# Patient Record
Sex: FEMALE | Race: WHITE | URBAN - METROPOLITAN AREA
[De-identification: names, ages, dates, MRNs, and addresses within clinical notes are randomized per-mention and may not be internally consistent; named-entity substitution may affect disease eponyms.]

---

## 2018-04-05 ENCOUNTER — HISTORICAL (OUTPATIENT)
Dept: SURGERY | Facility: HOSPITAL | Age: 23
End: 2018-04-05

## 2019-11-05 LAB — RAPID GROUP A STREP (OHS): NEGATIVE

## 2019-12-05 LAB
INFLUENZA A ANTIGEN, POC: NEGATIVE
INFLUENZA B ANTIGEN, POC: NEGATIVE
RAPID GROUP A STREP (OHS): NEGATIVE

## 2020-04-28 ENCOUNTER — HISTORICAL (OUTPATIENT)
Dept: URGENT CARE | Facility: CLINIC | Age: 25
End: 2020-04-28

## 2020-04-28 LAB
INFLUENZA A ANTIGEN, POC: NEGATIVE
INFLUENZA B ANTIGEN, POC: NEGATIVE

## 2021-02-19 LAB — RAPID GROUP A STREP (OHS): NEGATIVE

## 2022-04-09 ENCOUNTER — HISTORICAL (OUTPATIENT)
Dept: ADMINISTRATIVE | Facility: HOSPITAL | Age: 27
End: 2022-04-09

## 2022-04-29 VITALS
DIASTOLIC BLOOD PRESSURE: 82 MMHG | BODY MASS INDEX: 24.99 KG/M2 | HEIGHT: 68 IN | OXYGEN SATURATION: 98 % | WEIGHT: 164.88 LBS | SYSTOLIC BLOOD PRESSURE: 122 MMHG

## 2022-04-30 NOTE — OP NOTE
Patient:   Stephania Cervantes            MRN: [id number]            FIN: [id number]               Age:   22 years     Sex:  Female     :  1995   Associated Diagnoses:   None   Author:   Keaton Avila MD            DATE OF SURGERY:   18    SURGEON:  Keaton Avila MD    PREOPERATIVE DIAGNOSIS: Chronic Cholecystitis / Hyperkinetic Gallbladder    POST OPERATIVE DIAGNOSIS:  Same.    PROCEDURE:  Laparoscopic cholecystectomy.    ANESTHESIA:  General endotracheal anesthesia.    ESTIMATED BLOOD LOSS:  Approximately 20 cc.   Blood administered, none.    LAP AND INSTRUMENT COUNT:  Correct X2 at the end of the case.    SPECIMENS:  Gallbladder.    INDICATION AND SIGNIFICANT HISTORY:  Patient is a 22-year-old female complaining of post prandial right upper quadrant pain associated with fatty meals.  Patient was found to have biliary hyperkinesia and chronic cholecystitis with HIDA scan with ejection fraction approximately 85%.  Risks and benefits of surgery were discussed with the patient who voiced the understanding and wish to proceed with elective laparoscopic cholecystectomy.    PROCEDURE IN DETAIL:  Once informed consents were obtained, patient was taken to the operating room and placed supine on the operating table.  After general endotracheal anesthesia was induced, the abdomen was prepped and draped in the standard sterile surgical fashion.  Periumbilical incision was made with the scalpel and the Veress needle was used to enter the abdominal cavity.  Pneumoperitoneum was then created with insufflation.  After adequate insufflation was obtained, 5 millimeter trocar port were placed through this wound.  Two additional 5 millimeter ports were placed in the right upper quadrant followed by 5 millimeter trocar placed subxiphoid.  The gallbladder was then visualized and retracted both superiorly and laterally to achieve the critical view.  Dissection was carried out in lateral to medial fashion.   The cystic duct were first visualized followed by cystic artery appropriate.  Two clips were placed twice proximally on each structure and one distally and resected.  The gallbladder was then removed from the liver bed using the hook cautery and passed off the table as specimen through the periumbilical trocar port site.  Attention was then redirected to the gallbladder fossa, no active bleeding was noted.  Good hemostasis was visualized.  All ports were then removed under direct visualization with no active bleeding noted.  Skin was then closed with 4-0 Vicryl suture in interrupted fashion.  Skin was  cleaned and dry sterile dressing was placed on the wound.  Lap and instrument  counts were correct X2 at the end of the case.  Patient tolerated the procedure well and was transferred to recovery room in good condition.

## 2022-09-16 ENCOUNTER — HISTORICAL (OUTPATIENT)
Dept: ADMINISTRATIVE | Facility: HOSPITAL | Age: 27
End: 2022-09-16

## 2024-10-08 ENCOUNTER — HOSPITAL ENCOUNTER (EMERGENCY)
Facility: HOSPITAL | Age: 29
Discharge: HOME | End: 2024-10-08
Attending: EMERGENCY MEDICINE
Payer: COMMERCIAL

## 2024-10-08 VITALS
HEART RATE: 76 BPM | BODY MASS INDEX: 26.7 KG/M2 | WEIGHT: 174.19 LBS | TEMPERATURE: 98 F | SYSTOLIC BLOOD PRESSURE: 116 MMHG | OXYGEN SATURATION: 100 % | DIASTOLIC BLOOD PRESSURE: 70 MMHG | RESPIRATION RATE: 16 BRPM

## 2024-10-08 DIAGNOSIS — R10.9 ABDOMINAL PAIN, UNSPECIFIED ABDOMINAL LOCATION: Primary | ICD-10-CM

## 2024-10-08 DIAGNOSIS — R11.2 NAUSEA AND VOMITING, UNSPECIFIED VOMITING TYPE: ICD-10-CM

## 2024-10-08 LAB
ALBUMIN SERPL-MCNC: 4.2 G/DL (ref 3.5–5)
ALBUMIN/GLOB SERPL: 1 RATIO (ref 1.1–2)
ALP SERPL-CCNC: 78 UNIT/L (ref 40–150)
ALT SERPL-CCNC: 26 UNIT/L (ref 0–55)
ANION GAP SERPL CALC-SCNC: 10 MEQ/L
AST SERPL-CCNC: 18 UNIT/L (ref 5–34)
BACTERIA #/AREA URNS AUTO: ABNORMAL /HPF
BASOPHILS # BLD AUTO: 0.05 X10(3)/MCL
BASOPHILS NFR BLD AUTO: 0.4 %
BILIRUB SERPL-MCNC: 0.9 MG/DL
BILIRUB UR QL STRIP.AUTO: NEGATIVE
BUN SERPL-MCNC: 9.6 MG/DL (ref 7–18.7)
CALCIUM SERPL-MCNC: 9.8 MG/DL (ref 8.4–10.2)
CAOX CRY UR QL COMP ASSIST: ABNORMAL
CHLORIDE SERPL-SCNC: 108 MMOL/L (ref 98–107)
CLARITY UR: ABNORMAL
CO2 SERPL-SCNC: 21 MMOL/L (ref 22–29)
COLOR UR AUTO: YELLOW
CREAT SERPL-MCNC: 0.83 MG/DL (ref 0.55–1.02)
CREAT/UREA NIT SERPL: 12
EOSINOPHIL # BLD AUTO: 0.22 X10(3)/MCL (ref 0–0.9)
EOSINOPHIL NFR BLD AUTO: 1.8 %
ERYTHROCYTE [DISTWIDTH] IN BLOOD BY AUTOMATED COUNT: 13.1 % (ref 11.5–17)
GFR SERPLBLD CREATININE-BSD FMLA CKD-EPI: >60 ML/MIN/1.73/M2
GLOBULIN SER-MCNC: 4.1 GM/DL (ref 2.4–3.5)
GLUCOSE SERPL-MCNC: 96 MG/DL (ref 74–100)
GLUCOSE UR QL STRIP: NORMAL
HCT VFR BLD AUTO: 45.2 % (ref 37–47)
HGB BLD-MCNC: 15.1 G/DL (ref 12–16)
HGB UR QL STRIP: ABNORMAL
HOLD SPECIMEN: NORMAL
HYALINE CASTS #/AREA URNS LPF: ABNORMAL /LPF
IMM GRANULOCYTES # BLD AUTO: 0.04 X10(3)/MCL (ref 0–0.04)
IMM GRANULOCYTES NFR BLD AUTO: 0.3 %
KETONES UR QL STRIP: NEGATIVE
LEUKOCYTE ESTERASE UR QL STRIP: 500
LIPASE SERPL-CCNC: 18 U/L
LYMPHOCYTES # BLD AUTO: 1.66 X10(3)/MCL (ref 0.6–4.6)
LYMPHOCYTES NFR BLD AUTO: 13.7 %
MAGNESIUM SERPL-MCNC: 2.2 MG/DL (ref 1.6–2.6)
MCH RBC QN AUTO: 29.8 PG (ref 27–31)
MCHC RBC AUTO-ENTMCNC: 33.4 G/DL (ref 33–36)
MCV RBC AUTO: 89.3 FL (ref 80–94)
MONOCYTES # BLD AUTO: 0.68 X10(3)/MCL (ref 0.1–1.3)
MONOCYTES NFR BLD AUTO: 5.6 %
MUCOUS THREADS URNS QL MICRO: ABNORMAL /LPF
NEUTROPHILS # BLD AUTO: 9.48 X10(3)/MCL (ref 2.1–9.2)
NEUTROPHILS NFR BLD AUTO: 78.2 %
NITRITE UR QL STRIP: NEGATIVE
NRBC BLD AUTO-RTO: 0 %
PH UR STRIP: 5 [PH]
PLATELET # BLD AUTO: 465 X10(3)/MCL (ref 130–400)
PMV BLD AUTO: 10.1 FL (ref 7.4–10.4)
POTASSIUM SERPL-SCNC: 4.1 MMOL/L (ref 3.5–5.1)
PROT SERPL-MCNC: 8.3 GM/DL (ref 6.4–8.3)
PROT UR QL STRIP: ABNORMAL
RBC # BLD AUTO: 5.06 X10(6)/MCL (ref 4.2–5.4)
RBC #/AREA URNS AUTO: ABNORMAL /HPF
SODIUM SERPL-SCNC: 139 MMOL/L (ref 136–145)
SP GR UR STRIP.AUTO: 1.02 (ref 1–1.03)
SQUAMOUS #/AREA URNS LPF: ABNORMAL /HPF
UROBILINOGEN UR STRIP-ACNC: NORMAL
WBC # BLD AUTO: 12.13 X10(3)/MCL (ref 4.5–11.5)
WBC #/AREA URNS AUTO: ABNORMAL /HPF

## 2024-10-08 PROCEDURE — 80053 COMPREHEN METABOLIC PANEL: CPT | Performed by: EMERGENCY MEDICINE

## 2024-10-08 PROCEDURE — 99285 EMERGENCY DEPT VISIT HI MDM: CPT | Mod: 25

## 2024-10-08 PROCEDURE — 83690 ASSAY OF LIPASE: CPT | Performed by: EMERGENCY MEDICINE

## 2024-10-08 PROCEDURE — 25000003 PHARM REV CODE 250: Performed by: EMERGENCY MEDICINE

## 2024-10-08 PROCEDURE — 63600175 PHARM REV CODE 636 W HCPCS: Performed by: EMERGENCY MEDICINE

## 2024-10-08 PROCEDURE — 96374 THER/PROPH/DIAG INJ IV PUSH: CPT

## 2024-10-08 PROCEDURE — 96361 HYDRATE IV INFUSION ADD-ON: CPT

## 2024-10-08 PROCEDURE — 83735 ASSAY OF MAGNESIUM: CPT | Performed by: EMERGENCY MEDICINE

## 2024-10-08 PROCEDURE — 81001 URINALYSIS AUTO W/SCOPE: CPT | Performed by: EMERGENCY MEDICINE

## 2024-10-08 PROCEDURE — 87086 URINE CULTURE/COLONY COUNT: CPT | Performed by: EMERGENCY MEDICINE

## 2024-10-08 PROCEDURE — 81015 MICROSCOPIC EXAM OF URINE: CPT | Performed by: EMERGENCY MEDICINE

## 2024-10-08 PROCEDURE — 25500020 PHARM REV CODE 255

## 2024-10-08 PROCEDURE — 85025 COMPLETE CBC W/AUTO DIFF WBC: CPT | Performed by: EMERGENCY MEDICINE

## 2024-10-08 RX ORDER — PROCHLORPERAZINE MALEATE 10 MG
10 TABLET ORAL 3 TIMES DAILY PRN
Qty: 15 TABLET | Refills: 0 | Status: SHIPPED | OUTPATIENT
Start: 2024-10-08

## 2024-10-08 RX ORDER — ONDANSETRON 4 MG/1
4 TABLET, ORALLY DISINTEGRATING ORAL
Status: COMPLETED | OUTPATIENT
Start: 2024-10-08 | End: 2024-10-08

## 2024-10-08 RX ORDER — PROMETHAZINE HYDROCHLORIDE 25 MG/1
25 TABLET ORAL
Status: COMPLETED | OUTPATIENT
Start: 2024-10-08 | End: 2024-10-08

## 2024-10-08 RX ORDER — DICYCLOMINE HYDROCHLORIDE 20 MG/1
20 TABLET ORAL EVERY 6 HOURS
Qty: 120 TABLET | Refills: 0 | Status: SHIPPED | OUTPATIENT
Start: 2024-10-08

## 2024-10-08 RX ORDER — PROCHLORPERAZINE MALEATE 10 MG
10 TABLET ORAL 3 TIMES DAILY PRN
Qty: 15 TABLET | Refills: 0 | Status: SHIPPED | OUTPATIENT
Start: 2024-10-08 | End: 2024-10-08

## 2024-10-08 RX ORDER — DICYCLOMINE HYDROCHLORIDE 20 MG/1
20 TABLET ORAL EVERY 6 HOURS
Qty: 120 TABLET | Refills: 0 | Status: SHIPPED | OUTPATIENT
Start: 2024-10-08 | End: 2024-10-08

## 2024-10-08 RX ORDER — PROCHLORPERAZINE EDISYLATE 5 MG/ML
10 INJECTION INTRAMUSCULAR; INTRAVENOUS
Status: COMPLETED | OUTPATIENT
Start: 2024-10-08 | End: 2024-10-08

## 2024-10-08 RX ORDER — LIDOCAINE HYDROCHLORIDE 20 MG/ML
15 SOLUTION OROPHARYNGEAL
Status: COMPLETED | OUTPATIENT
Start: 2024-10-08 | End: 2024-10-08

## 2024-10-08 RX ADMIN — SODIUM CHLORIDE 1000 ML: 9 INJECTION, SOLUTION INTRAVENOUS at 12:10

## 2024-10-08 RX ADMIN — PROCHLORPERAZINE EDISYLATE 10 MG: 5 INJECTION INTRAMUSCULAR; INTRAVENOUS at 12:10

## 2024-10-08 RX ADMIN — IOHEXOL 100 ML: 350 INJECTION, SOLUTION INTRAVENOUS at 03:10

## 2024-10-08 RX ADMIN — ONDANSETRON 4 MG: 4 TABLET, ORALLY DISINTEGRATING ORAL at 10:10

## 2024-10-08 RX ADMIN — PROMETHAZINE HYDROCHLORIDE 25 MG: 25 TABLET ORAL at 10:10

## 2024-10-08 RX ADMIN — ALUMINUM HYDROXIDE AND MAGNESIUM HYDROXIDE 30 ML: 200; 200 SUSPENSION ORAL at 10:10

## 2024-10-08 RX ADMIN — LIDOCAINE HYDROCHLORIDE 15 ML: 20 SOLUTION ORAL at 10:10

## 2024-10-08 NOTE — ED PROVIDER NOTES
"ED PROVIDER NOTE  10/8/2024    CHIEF COMPLAINT:   Chief Complaint   Patient presents with    Abdominal Pain     PT W CO ABD PAIN W NVD X 2 WEEKS. WORSE SINCE YESTERDAY.        HISTORY OF PRESENT ILLNESS:   Stephania Cervantes is a 29 y.o. female who presents with chief complaint Abdominal pain. Onset was today with epigastric abdominal pain associated with nonbloody diarrhea that began yesterday and nausea and vomiting today. She states that she "stays with nausea and usually takes promethazine."  She reports that for the past 2 weeks had LLQ abdominal pain that has since resolved.    The history is provided by the patient.         REVIEW OF SYSTEMS: as noted in the HPI.  NURSING NOTES REVIEWED      PAST MEDICAL/SURGICAL HISTORY: History reviewed. No pertinent past medical history. History reviewed. No pertinent surgical history.    FAMILY HISTORY: No family history on file.    SOCIAL HISTORY:   Social History     Tobacco Use    Smoking status: Never    Smokeless tobacco: Never       ALLERGIES:   Review of patient's allergies indicates:   Allergen Reactions    Penicillins Other (See Comments) and Hives       PHYSICAL EXAM:  Initial Vitals [10/08/24 1020]   BP Pulse Resp Temp SpO2   107/72 84 16 97.9 °F (36.6 °C) 100 %      MAP       --         Physical Exam    Nursing note and vitals reviewed.  Constitutional: She appears well-developed and well-nourished.   HENT:   Head: Normocephalic and atraumatic. Mouth/Throat: Uvula is midline and mucous membranes are normal.   Eyes: EOM are normal. Pupils are equal, round, and reactive to light.   Neck: Trachea normal. Neck supple.   Cardiovascular:  Normal rate, regular rhythm, intact distal pulses and normal pulses.           Pulmonary/Chest: Effort normal and breath sounds normal.   Abdominal: Abdomen is soft. Bowel sounds are normal. There is abdominal tenderness in the right upper quadrant and epigastric area. There is no rebound, no guarding, no tenderness at " McBurney's point and negative Bliss's sign.   Musculoskeletal:         General: Normal range of motion.      Cervical back: Neck supple.     Neurological: She is alert and oriented to person, place, and time. GCS eye subscore is 4. GCS verbal subscore is 5. GCS motor subscore is 6.   Skin: Skin is warm and dry.   Psychiatric: She has a normal mood and affect. Her speech is normal. Thought content normal.         RESULTS:  Labs Reviewed   URINALYSIS, REFLEX TO URINE CULTURE - Abnormal       Result Value    Color, UA Yellow      Appearance, UA Turbid (*)     Specific Gravity, UA 1.024      pH, UA 5.0      Protein, UA Trace (*)     Glucose, UA Normal      Ketones, UA Negative      Blood, UA 3+ (*)     Bilirubin, UA Negative      Urobilinogen, UA Normal      Nitrites, UA Negative      Leukocyte Esterase,  (*)     RBC, UA 6-10 (*)     WBC, UA 11-20 (*)     Bacteria, UA Trace (*)     Squamous Epithelial Cells, UA Few (*)     Mucous, UA Few (*)     Hyaline Casts, UA None Seen      Calcium Oxalate Crystals, UA Many (*)    COMPREHENSIVE METABOLIC PANEL - Abnormal    Sodium 139      Potassium 4.1      Chloride 108 (*)     CO2 21 (*)     Glucose 96      Blood Urea Nitrogen 9.6      Creatinine 0.83      Calcium 9.8      Protein Total 8.3      Albumin 4.2      Globulin 4.1 (*)     Albumin/Globulin Ratio 1.0 (*)     Bilirubin Total 0.9      ALP 78      ALT 26      AST 18      eGFR >60      Anion Gap 10.0      BUN/Creatinine Ratio 12     CBC WITH DIFFERENTIAL - Abnormal    WBC 12.13 (*)     RBC 5.06      Hgb 15.1      Hct 45.2      MCV 89.3      MCH 29.8      MCHC 33.4      RDW 13.1      Platelet 465 (*)     MPV 10.1      Neut % 78.2      Lymph % 13.7      Mono % 5.6      Eos % 1.8      Basophil % 0.4      Lymph # 1.66      Neut # 9.48 (*)     Mono # 0.68      Eos # 0.22      Baso # 0.05      IG# 0.04      IG% 0.3      NRBC% 0.0     MAGNESIUM - Normal    Magnesium Level 2.20     LIPASE - Normal    Lipase Level 18      CULTURE, URINE    Urine Culture No Growth At 24 Hours     CBC W/ AUTO DIFFERENTIAL    Narrative:     The following orders were created for panel order CBC auto differential.  Procedure                               Abnormality         Status                     ---------                               -----------         ------                     CBC with Differential[[id number]]       Abnormal            Final result                 Please view results for these tests on the individual orders.   EXTRA TUBES    Narrative:     The following orders were created for panel order EXTRA TUBES.  Procedure                               Abnormality         Status                     ---------                               -----------         ------                     Light Blue Top Hold[[id number]]                             Final result               Light Green Top Hold[[id number]]                            Final result               Gold Top Hold[[id number]]                                   Final result               Pink Top Hold[[id number]]                                   Final result                 Please view results for these tests on the individual orders.   LIGHT BLUE TOP HOLD    Extra Tube Hold for add-ons.     LIGHT GREEN TOP HOLD    Extra Tube Hold for add-ons.     GOLD TOP HOLD    Extra Tube Hold for add-ons.     PINK TOP HOLD    Extra Tube Hold for add-ons.       Imaging Results              CT Abdomen Pelvis With IV Contrast NO Oral Contrast (Final result)  Result time 10/08/24 15:27:03      Final result by Henrique Harkins MD (10/08/24 15:27:03)                   Impression:      Left ovarian cysts    Otherwise unremarkable      Electronically signed by: Yandel Harkins  Date:    10/08/2024  Time:    15:27               Narrative:    EXAMINATION:  CT ABDOMEN PELVIS WITH IV CONTRAST    CLINICAL HISTORY:  Epigastric pain;Nausea/vomiting;    TECHNIQUE:  Low dose axial images, sagittal and coronal  reformations were obtained from the lung bases to the pubic symphysis following the IV administration of contrast. Automatic exposure control (AEC) is utilized to reduce patient radiation exposure.    COMPARISON:  06/09/2016    FINDINGS:  The lung bases are clear.    The liver appears normal.  There is a punctate cystic area along the inferior margin of the liver..  Portal and hepatic veins appear normal.    The patient is status post cholecystectomy..    The pancreas appears normal.  No pancreatic mass or lesion is seen.    The spleen shows no acute abnormality.    The adrenal glands appear normal.  No adrenal nodule is seen.    The kidneys appear normal.  No hydronephrosis is seen.  No hydroureter is seen.  No nephrolithiasis is seen.  No obvious ureteral stones are seen.    Urinary bladder appears grossly unremarkable.    The uterus appears normal for the patient's age.  There are some ovarian cysts seen the left side.  One cyst measures 4.1 x 2.7 cm and the other 1 measures 3.1 x 2.8 cm.    No colitis is seen.  No diverticulitis is seen.  No obvious colonic mass or lesion is seen.  The appendix appears normal.    No free air is seen.  No free fluid is seen.                        Wet Read by Papa De La Paz DO (10/08/24 15:20:46, [facility], Emergency Medicine)    Scattered Fluid-filled loops of small bowel, no bowel obstruction                                    PROCEDURES:  Procedures    ECG:       ED COURSE AND MEDICAL DECISION MAKING:  Medications   aluminum-magnesium hydroxide 200-200 mg/5 mL suspension 30 mL (30 mLs Oral Given 10/8/24 1040)   LIDOcaine viscous HCl 2% oral solution 15 mL (15 mLs Oral Given 10/8/24 1040)   ondansetron disintegrating tablet 4 mg (4 mg Oral Given 10/8/24 1040)   promethazine tablet 25 mg (25 mg Oral Given 10/8/24 1040)   sodium chloride 0.9% bolus 1,000 mL 1,000 mL (0 mLs Intravenous Stopped 10/8/24 1349)   prochlorperazine injection Soln 10 mg (10 mg  Intravenous Given 10/8/24 1250)   iohexoL (OMNIPAQUE 350) 350 mg iodine/mL injection (100 mLs  Given 10/8/24 1510)     ED Course as of 10/09/24 0750   Tue Oct 08, 2024   1117 WBC(!): 12.13 [IB]   1117 Hemoglobin: 15.1 [IB]   1117 Platelet Count(!): 465 [IB]   1118 Creatinine: 0.83 [IB]   1118 NITRITE UA: Negative [IB]   1118 Leukocyte Esterase, UA(!): 500 [IB]   1118 RBC, UA(!): 6-10 [IB]   1118 WBC, UA(!): 11-20 [IB]   1118 Bacteria, UA(!): Trace [IB]   1118 Lipase: 18 [IB]   1118 Magnesium : 2.20 [IB]      ED Course User Index  [IB] Papa De La Paz, DO        Medical Decision Making  This patient presents with abdominal pain of unclear etiology. A CT scan was performed to evaluate for potential causes of the abdominal pain, however, neither the clinical exam nor the CT has identified an emergent etiology for the abdominal pain. Specifically, given the benign exam, the laboratory studies, and unremarkable CT, I have a very low suspicion for appendicitis, ischemic bowel, bowel perforation, or any other life threatening disease. I have discussed with the patient the level of uncertainty with undifferentiated abdominal pain and clearly explained the need to follow-up as noted on the discharge instructions, or return to the Emergency Department immediately if the pain worsens, develops fever, persistent and uncontrollable vomiting, or for any new symptoms or concerns.  Given strict ED return precautions. I have spoken with the patient and/or caregivers. I have explained the patient's condition, diagnoses and treatment plan based on the information available to me at this time. I have answered the patient's and/or caregiver's questions and addressed any concerns. The patient and/or caregivers have as good an understanding of the patient's diagnosis, condition and treatment plan as can be expected at this point. The vital signs have been stable. The patient's condition is stable and appropriate for discharge from the  emergency department.     The patient will pursue further outpatient evaluation with the primary care physician or other designated or consulting physician as outlined in the discharge instructions. The patient and/or caregivers are agreeable to this plan of care and follow-up instructions have been explained in detail. The patient and/or caregivers have received these instructions in written format and have expressed an understanding of the discharge instructions. The patient and/or caregivers are aware that any significant change in condition or worsening of symptoms should prompt an immediate return to this or the closest emergency department or a call to 911.    Amount and/or Complexity of Data Reviewed  Labs: ordered. Decision-making details documented in ED Course.  Radiology: ordered and independent interpretation performed.    Risk  OTC drugs.  Prescription drug management.        CLINICAL IMPRESSION:  1. Abdominal pain, unspecified abdominal location    2. Nausea and vomiting, unspecified vomiting type        DISPOSITION:   ED Disposition Condition    Discharge Stable            ED Prescriptions       Medication Sig Dispense Start Date End Date Auth. Provider    prochlorperazine (COMPAZINE) 10 MG tablet  (Status: Discontinued) Take 1 tablet (10 mg total) by mouth 3 (three) times daily as needed (nausea vomiting). 15 tablet 10/8/2024 10/8/2024 Papa De La Paz DO    dicyclomine (BENTYL) 20 mg tablet  (Status: Discontinued) Take 1 tablet (20 mg total) by mouth every 6 (six) hours. 120 tablet 10/8/2024 10/8/2024 Papa De La Paz DO    dicyclomine (BENTYL) 20 mg tablet Take 1 tablet (20 mg total) by mouth every 6 (six) hours. 120 tablet 10/8/2024 -- Papa De La Paz DO    prochlorperazine (COMPAZINE) 10 MG tablet Take 1 tablet (10 mg total) by mouth 3 (three) times daily as needed (nausea vomiting). 15 tablet 10/8/2024 -- Papa De La Paz DO          Follow-up Information       Follow up With Specialties Details  Why Contact Info    [facility] Emergency Medicine  If symptoms worsen [address] [facility] [place] [address] [phone]               Papa De La Paz DO  10/09/24 0757

## 2024-10-10 LAB — BACTERIA UR CULT: NORMAL

## 2024-11-11 DIAGNOSIS — K58.1 IRRITABLE BOWEL SYNDROME WITH CONSTIPATION: Primary | ICD-10-CM

## 2024-11-11 DIAGNOSIS — K43.9 VENTRAL HERNIA WITHOUT OBSTRUCTION OR GANGRENE: ICD-10-CM

## 2024-11-12 ENCOUNTER — HOSPITAL ENCOUNTER (OUTPATIENT)
Dept: RADIOLOGY | Facility: HOSPITAL | Age: 29
Discharge: HOME | End: 2024-11-12
Attending: STUDENT IN AN ORGANIZED HEALTH CARE EDUCATION/TRAINING PROGRAM
Payer: COMMERCIAL

## 2024-11-12 DIAGNOSIS — K43.9 VENTRAL HERNIA WITHOUT OBSTRUCTION OR GANGRENE: ICD-10-CM

## 2024-11-12 DIAGNOSIS — K58.1 IRRITABLE BOWEL SYNDROME WITH CONSTIPATION: ICD-10-CM

## 2024-11-12 PROCEDURE — 76700 US EXAM ABDOM COMPLETE: CPT | Mod: TC

## 2024-12-31 ENCOUNTER — OFFICE VISIT (OUTPATIENT)
Dept: PRIMARY CARE CLINIC | Facility: CLINIC | Age: 29
End: 2024-12-31
Payer: COMMERCIAL

## 2024-12-31 VITALS
WEIGHT: 179 LBS | HEART RATE: 78 BPM | SYSTOLIC BLOOD PRESSURE: 113 MMHG | DIASTOLIC BLOOD PRESSURE: 71 MMHG | TEMPERATURE: 98 F | OXYGEN SATURATION: 99 % | BODY MASS INDEX: 27.13 KG/M2 | RESPIRATION RATE: 18 BRPM | HEIGHT: 68 IN

## 2024-12-31 DIAGNOSIS — Z00.00 ENCOUNTER FOR MEDICAL EXAMINATION TO ESTABLISH CARE: Primary | ICD-10-CM

## 2024-12-31 DIAGNOSIS — K22.2 ESOPHAGEAL STRICTURE: ICD-10-CM

## 2024-12-31 DIAGNOSIS — K42.9 UMBILICAL HERNIA WITHOUT OBSTRUCTION AND WITHOUT GANGRENE: ICD-10-CM

## 2024-12-31 DIAGNOSIS — F41.1 GENERALIZED ANXIETY DISORDER: ICD-10-CM

## 2024-12-31 DIAGNOSIS — K76.89 LIVER CYST: ICD-10-CM

## 2024-12-31 DIAGNOSIS — D49.7 PITUITARY TUMOR: ICD-10-CM

## 2024-12-31 PROCEDURE — 1160F RVW MEDS BY RX/DR IN RCRD: CPT | Mod: CPTII,,, | Performed by: FAMILY MEDICINE

## 2024-12-31 PROCEDURE — 99204 OFFICE O/P NEW MOD 45 MIN: CPT | Mod: ,,, | Performed by: FAMILY MEDICINE

## 2024-12-31 PROCEDURE — 3074F SYST BP LT 130 MM HG: CPT | Mod: CPTII,,, | Performed by: FAMILY MEDICINE

## 2024-12-31 PROCEDURE — 3078F DIAST BP <80 MM HG: CPT | Mod: CPTII,,, | Performed by: FAMILY MEDICINE

## 2024-12-31 PROCEDURE — G2211 COMPLEX E/M VISIT ADD ON: HCPCS | Mod: ,,, | Performed by: FAMILY MEDICINE

## 2024-12-31 PROCEDURE — 1159F MED LIST DOCD IN RCRD: CPT | Mod: CPTII,,, | Performed by: FAMILY MEDICINE

## 2024-12-31 PROCEDURE — 3008F BODY MASS INDEX DOCD: CPT | Mod: CPTII,,, | Performed by: FAMILY MEDICINE

## 2024-12-31 RX ORDER — AMITRIPTYLINE HYDROCHLORIDE 10 MG/1
10 TABLET, FILM COATED ORAL NIGHTLY PRN
Qty: 90 TABLET | Refills: 3 | Status: SHIPPED | OUTPATIENT
Start: 2024-12-31 | End: 2025-12-31

## 2024-12-31 RX ORDER — HYDROXYZINE PAMOATE 25 MG/1
25 CAPSULE ORAL EVERY 8 HOURS PRN
Qty: 15 CAPSULE | Refills: 0 | Status: SHIPPED | OUTPATIENT
Start: 2024-12-31 | End: 2025-01-05

## 2024-12-31 NOTE — ASSESSMENT & PLAN NOTE
Trial of vistaril at bedtime or for attacks, can cause sedation.  Amitriptyline at bedtime to see if it will help with sleep, mood, nausea, and HA.

## 2024-12-31 NOTE — PROGRESS NOTES
Family Medicine    Patient ID: [id number]     Chief Complaint: Establish Care (Left side pain over a month. Feeling depress lately. Hernia. Having trouble focusing. )      HPI:     Stephania Cervantes is a 29 y.o. female here today to establish care. Multiple concerns today.    L sided pain: went to ER and got CT and labs, US after that showed a small ventral hernia, CT with L ovarian cysts otherwise no concerns.  Worse right after eating and with abdominal pressure  Pituitary tumor: MRI with concern of enlargement  3. Hx IBS-D, now with constipation, early satiety, hx of esophageal stricture with nausea now and dysphasia, hx GERD but not taking PPIs.  4. Ovarian cysts: seeing OBGYN, no meds at this time, hx of OCP use  4. Incidental liver cyst: size not reported on imaging 2024, not seen on US 11/2024  5. Vascular abnormality on MRI 2024, no other details given on MRI, hx of HA, on and off blurry vision, arm and leg weakness  6. Chiari malformation: saw Neuro and did not like plan of care, no meds taken,   7. Inattention: getting evaluated for ADD/ADHD from psych due to meds like Zoloft and Wellbutrin not helping with anxiety or depression, no SI ro HI  8. Anxiety: associated insomnia, mind racing, feeling tired all the time, no focal cause.      Past Medical History:   Diagnosis Date    Chiari malformation type I     Esophageal stricture     Fibrocystic disease of both breasts     Generalized anxiety disorder     GERD (gastroesophageal reflux disease)     Liver cyst     Pituitary tumor     Umbilical hernia         Past Surgical History:   Procedure Laterality Date    CHOLECYSTECTOMY  2017    FRACTURE SURGERY  2015    Left ankle        Social History     Tobacco Use    Smoking status: Every Day     Types: Vaping with nicotine    Smokeless tobacco: Never   Substance and Sexual Activity    Alcohol use: Yes     Alcohol/week: 1.0 standard drink of alcohol     Types: 1 Cans of beer per week    Drug use: Never     "Sexual activity: Yes     Partners: Female     Birth control/protection: None        Current Outpatient Medications   Medication Instructions    amitriptyline (ELAVIL) 10 mg, Oral, Nightly PRN    hydrOXYzine pamoate (VISTARIL) 25 mg, Oral, Every 8 hours PRN         Review of patient's allergies indicates:   Allergen Reactions    Penicillins Other (See Comments) and Hives        Patient Care Team:  Jeanine Jasso MD as PCP - General (Family Medicine)     Subjective:     Review of Systems    12 point review of systems conducted, negative except as stated in the history of present illness. See HPI for details.    Objective:     Visit Vitals  /71 (BP Location: Right arm, Patient Position: Sitting)   Pulse 78   Temp 97.8 °F (36.6 °C) (Oral)   Resp 18   Ht 5' 7.72" (1.72 m)   Wt 81.2 kg (179 lb)   SpO2 99%   BMI 27.44 kg/m²       Physical Exam  Vitals and nursing note reviewed.   Constitutional:       Appearance: Normal appearance.   HENT:      Mouth/Throat:      Mouth: Mucous membranes are moist.   Cardiovascular:      Rate and Rhythm: Normal rate and regular rhythm.   Pulmonary:      Effort: Pulmonary effort is normal.      Breath sounds: Normal breath sounds.   Neurological:      General: No focal deficit present.      Mental Status: She is alert.   Psychiatric:         Mood and Affect: Mood normal.         Labs Reviewed:     Chemistry:  Lab Results   Component Value Date     10/08/2024    K 4.1 10/08/2024    BUN 9.6 10/08/2024    CREATININE 0.83 10/08/2024    EGFRNORACEVR >60 10/08/2024    GLUCOSE 96 10/08/2024    CALCIUM 9.8 10/08/2024    ALKPHOS 78 10/08/2024    LABPROT 8.3 10/08/2024    ALBUMIN 4.2 10/08/2024    BILIDIR 0.4 04/20/2021    IBILI 0.70 04/20/2021    AST 18 10/08/2024    ALT 26 10/08/2024    MG 2.20 10/08/2024    TSH 1.810 08/03/2022        Lab Results   Component Value Date    HGBA1C 5.6 12/12/2022        Hematology:  Lab Results   Component Value Date    WBC 12.13 (H) 10/08/2024    HGB " 15.1 10/08/2024    HCT 45.2 10/08/2024     (H) 10/08/2024       Lipid Panel:  Lab Results   Component Value Date    CHOL 154 08/03/2022    HDL 43 08/03/2022    TRIG 105 08/03/2022        Urine:  Lab Results   Component Value Date    APPEARANCEUA Turbid (A) 10/08/2024    SGUA 1.024 10/08/2024    PROTEINUA Trace (A) 10/08/2024    KETONESUA Negative 10/08/2024    LEUKOCYTESUR 500 (A) 10/08/2024    RBCUA 6-10 (A) 10/08/2024    WBCUA 11-20 (A) 10/08/2024    BACTERIA Trace (A) 10/08/2024    SQEPUA Few (A) 10/08/2024    HYALINECASTS None Seen 10/08/2024        Assessment:       ICD-10-CM ICD-9-CM   1. Encounter for medical examination to establish care  Z00.00 V70.9   2. Esophageal stricture  K22.2 530.3   3. Generalized anxiety disorder  F41.1 300.02   4. Pituitary tumor  D49.7 239.7   5. Umbilical hernia without obstruction and without gangrene  K42.9 553.1   6. Liver cyst  K76.89 573.8        Plan:     1. Encounter for medical examination to establish care  -     CBC Auto Differential; Future; Expected date: 12/31/2024  -     Comprehensive Metabolic Panel; Future; Expected date: 12/31/2024  -     Lipid Panel; Future; Expected date: 12/31/2024  -     TSH; Future; Expected date: 12/31/2024  -     Hemoglobin A1C; Future; Expected date: 12/31/2024    2. Esophageal stricture  Assessment & Plan:  May be cause of some of the nausea.  GI referral placed today.  Continue to eat small meals as tolerated.  May need to get back on to PPI even without other symptoms.      Orders:  -     Ambulatory referral/consult to Gastroenterology; Future; Expected date: 01/07/2025    3. Generalized anxiety disorder  Assessment & Plan:  Trial of vistaril at bedtime or for attacks, can cause sedation.  Amitriptyline at bedtime to see if it will help with sleep, mood, nausea, and HA.     Orders:  -     hydrOXYzine pamoate (VISTARIL) 25 MG Cap; Take 1 capsule (25 mg total) by mouth every 8 (eight) hours as needed (as needed for itching).   Dispense: 15 capsule; Refill: 0  -     amitriptyline (ELAVIL) 10 MG tablet; Take 1 tablet (10 mg total) by mouth nightly as needed for Insomnia.  Dispense: 90 tablet; Refill: 3    4. Pituitary tumor  Assessment & Plan:  Levels stable 2024, larger per MRI 2024 compared to 3 years before. Will need to follow.         5. Umbilical hernia without obstruction and without gangrene  Assessment & Plan:  Referral placed to GI for assistance.  US did not state if fatty tissue or gut protrusion.  ER for severe worsening.     Orders:  -     Ambulatory referral/consult to General Surgery; Future; Expected date: 01/07/2025    6. Liver cyst  Assessment & Plan:  Continue to monitor with repeat US planned 5/2025.             Follow up in about 4 weeks (around 1/28/2025) for Annual Wellness, With labwork prior to visit. In addition to their scheduled follow up, the patient has also been instructed to follow up on as needed basis.     Future Appointments   Date Time Provider Department Center   1/31/2025  9:30 AM Jeanine Jasso MD [facility] JACKIE Jasso MD

## 2024-12-31 NOTE — ASSESSMENT & PLAN NOTE
May be cause of some of the nausea.  GI referral placed today.  Continue to eat small meals as tolerated.  May need to get back on to PPI even without other symptoms.

## 2024-12-31 NOTE — ASSESSMENT & PLAN NOTE
Referral placed to GI for assistance.  US did not state if fatty tissue or gut protrusion.  ER for severe worsening.

## 2025-02-25 ENCOUNTER — TELEPHONE (OUTPATIENT)
Dept: PRIMARY CARE CLINIC | Facility: CLINIC | Age: 30
End: 2025-02-25
Payer: COMMERCIAL

## 2025-02-25 NOTE — TELEPHONE ENCOUNTER
----- Message from Jeanine Jasso MD sent at 2/25/2025  3:48 PM CST -----  Patient missed her appt.  Please have her follow up in clinic for additional management.  ----- Message -----  From: Ladan Abraham LPN  Sent: 2/25/2025   3:28 PM CST  To: Jeanine Jasso MD      ----- Message -----  From: Jenifer De La Cruz  Sent: 2/25/2025   3:27 PM CST  To: Romero Solorzano Staff    .Who Called: Stephania CervantesPreferneo Method of Contact: Phone CallPatient's Preferred Phone Number on File: [phone] Best Call Back Number, if different:Additional Information: pt asking for referral  to Dr.Hrishikesh Saenz ph [phone] pt ask if not be sent to [facility] and she don't mind traveling to [place] or [place]

## 2025-02-28 LAB
CHLAMYDIA TRACHOMATIS (PRECISION): NEGATIVE
NEISSERIA GONORRHOEAE (PRECISION): NEGATIVE
PAP RECOMMENDATION EXT: NORMAL
PAP SMEAR: NORMAL
TRICHOMONAS VAGINALIS (PRECISION): NEGATIVE

## 2025-03-10 ENCOUNTER — TELEPHONE (OUTPATIENT)
Dept: PRIMARY CARE CLINIC | Facility: CLINIC | Age: 30
End: 2025-03-10
Payer: COMMERCIAL

## 2025-03-10 NOTE — TELEPHONE ENCOUNTER
----- Message from Jeanine Jasso MD sent at 3/10/2025 11:43 AM CDT -----   screening is not recommended unless you have a strong family history of ovarian cancer.  It can be falsely positive.  Please review risks vs benefits with GYN.  ----- Message -----  From: Ladan Abraham LPN  Sent: 3/10/2025  11:23 AM CDT  To: Jeanine Jasso MD      ----- Message -----  From: Jenifer De La Cruz  Sent: 3/10/2025  11:22 AM CDT  To: Romero Solorzano Staff    .Who Called: Stephania Delgado BillyPreferneo Method of Contact: Phone CallPatient's Preferred Phone Number on File: [phone] Best Call Back Number, if different:Additional Information: pt asking to add to her labs ca-125 check levels for cancer please advice

## 2025-03-10 NOTE — TELEPHONE ENCOUNTER
----- Message from Kerry sent at 3/10/2025 11:43 AM CDT -----  Who Called: Stephania Tran is requesting assistance/information from provider's office.Symptoms (please be specific):  How long has patient had these symptoms:  List of preferred pharmacies on file (remove unneeded): [unfilled]If different, enter pharmacy into here including location and phone number: Preferred Method of Contact: Phone CallPatient's Preferred Phone Number on File: [phone] Best Call Back Number, if different:Additional Information: Pt called requesting to speak with nurse about some urgent lab work that her Obgyn is requesting

## 2025-03-10 NOTE — TELEPHONE ENCOUNTER
----- Message from Jenifer sent at 3/10/2025 12:36 PM CDT -----  .Who Called: Stephania CervantesPreferneo Method of Contact: Phone CallPatient's Preferred Phone Number on File: [phone] Best Call Back Number, if different:Additional Information: pt gyn called her and said she needs labs and her surgery will be moved up early as well add type & screen  please send as stat  and also call pt

## 2025-03-11 ENCOUNTER — LAB VISIT (OUTPATIENT)
Dept: LAB | Facility: HOSPITAL | Age: 30
End: 2025-03-11
Attending: FAMILY MEDICINE
Payer: COMMERCIAL

## 2025-03-11 ENCOUNTER — OFFICE VISIT (OUTPATIENT)
Dept: PRIMARY CARE CLINIC | Facility: CLINIC | Age: 30
End: 2025-03-11
Payer: COMMERCIAL

## 2025-03-11 ENCOUNTER — RESULTS FOLLOW-UP (OUTPATIENT)
Dept: PRIMARY CARE CLINIC | Facility: CLINIC | Age: 30
End: 2025-03-11

## 2025-03-11 VITALS
WEIGHT: 170 LBS | HEART RATE: 90 BPM | BODY MASS INDEX: 26.68 KG/M2 | HEIGHT: 67 IN | SYSTOLIC BLOOD PRESSURE: 126 MMHG | DIASTOLIC BLOOD PRESSURE: 75 MMHG

## 2025-03-11 DIAGNOSIS — Z00.00 ENCOUNTER FOR MEDICAL EXAMINATION TO ESTABLISH CARE: ICD-10-CM

## 2025-03-11 DIAGNOSIS — D49.7 PITUITARY TUMOR: ICD-10-CM

## 2025-03-11 DIAGNOSIS — F41.1 GENERALIZED ANXIETY DISORDER: ICD-10-CM

## 2025-03-11 DIAGNOSIS — Z00.00 WELLNESS EXAMINATION: Primary | ICD-10-CM

## 2025-03-11 DIAGNOSIS — K76.89 LIVER CYST: ICD-10-CM

## 2025-03-11 DIAGNOSIS — G47.00 INSOMNIA, UNSPECIFIED TYPE: ICD-10-CM

## 2025-03-11 DIAGNOSIS — R11.0 NAUSEA: ICD-10-CM

## 2025-03-11 LAB
ALBUMIN SERPL-MCNC: 4 G/DL (ref 3.5–5)
ALBUMIN/GLOB SERPL: 1.1 RATIO (ref 1.1–2)
ALP SERPL-CCNC: 64 UNIT/L (ref 40–150)
ALT SERPL-CCNC: 18 UNIT/L (ref 0–55)
ANION GAP SERPL CALC-SCNC: 9 MEQ/L
AST SERPL-CCNC: 15 UNIT/L (ref 5–34)
BASOPHILS # BLD AUTO: 0.04 X10(3)/MCL
BASOPHILS NFR BLD AUTO: 0.6 %
BILIRUB SERPL-MCNC: 1.1 MG/DL
BUN SERPL-MCNC: 9.8 MG/DL (ref 7–18.7)
CALCIUM SERPL-MCNC: 9 MG/DL (ref 8.4–10.2)
CHLORIDE SERPL-SCNC: 107 MMOL/L (ref 98–107)
CHOLEST SERPL-MCNC: 139 MG/DL
CHOLEST/HDLC SERPL: 3 {RATIO} (ref 0–5)
CO2 SERPL-SCNC: 22 MMOL/L (ref 22–29)
CREAT SERPL-MCNC: 0.75 MG/DL (ref 0.55–1.02)
CREAT/UREA NIT SERPL: 13
EOSINOPHIL # BLD AUTO: 0.12 X10(3)/MCL (ref 0–0.9)
EOSINOPHIL NFR BLD AUTO: 1.8 %
ERYTHROCYTE [DISTWIDTH] IN BLOOD BY AUTOMATED COUNT: 12.3 % (ref 11.5–17)
EST. AVERAGE GLUCOSE BLD GHB EST-MCNC: 96.8 MG/DL
GFR SERPLBLD CREATININE-BSD FMLA CKD-EPI: >60 ML/MIN/1.73/M2
GLOBULIN SER-MCNC: 3.8 GM/DL (ref 2.4–3.5)
GLUCOSE SERPL-MCNC: 99 MG/DL (ref 74–100)
HBA1C MFR BLD: 5 %
HCT VFR BLD AUTO: 40.5 % (ref 37–47)
HDLC SERPL-MCNC: 44 MG/DL (ref 35–60)
HGB BLD-MCNC: 13.7 G/DL (ref 12–16)
IMM GRANULOCYTES # BLD AUTO: 0.02 X10(3)/MCL (ref 0–0.04)
IMM GRANULOCYTES NFR BLD AUTO: 0.3 %
LDLC SERPL CALC-MCNC: 78 MG/DL (ref 50–140)
LYMPHOCYTES # BLD AUTO: 2.12 X10(3)/MCL (ref 0.6–4.6)
LYMPHOCYTES NFR BLD AUTO: 31.5 %
MCH RBC QN AUTO: 29.8 PG (ref 27–31)
MCHC RBC AUTO-ENTMCNC: 33.8 G/DL (ref 33–36)
MCV RBC AUTO: 88.2 FL (ref 80–94)
MONOCYTES # BLD AUTO: 0.57 X10(3)/MCL (ref 0.1–1.3)
MONOCYTES NFR BLD AUTO: 8.5 %
NEUTROPHILS # BLD AUTO: 3.87 X10(3)/MCL (ref 2.1–9.2)
NEUTROPHILS NFR BLD AUTO: 57.3 %
NRBC BLD AUTO-RTO: 0 %
PLATELET # BLD AUTO: 402 X10(3)/MCL (ref 130–400)
PMV BLD AUTO: 9.7 FL (ref 7.4–10.4)
POTASSIUM SERPL-SCNC: 4 MMOL/L (ref 3.5–5.1)
PROT SERPL-MCNC: 7.8 GM/DL (ref 6.4–8.3)
RBC # BLD AUTO: 4.59 X10(6)/MCL (ref 4.2–5.4)
SODIUM SERPL-SCNC: 138 MMOL/L (ref 136–145)
TRIGL SERPL-MCNC: 86 MG/DL (ref 37–140)
TSH SERPL-ACNC: 1.29 UIU/ML (ref 0.35–4.94)
VLDLC SERPL CALC-MCNC: 17 MG/DL
WBC # BLD AUTO: 6.74 X10(3)/MCL (ref 4.5–11.5)

## 2025-03-11 PROCEDURE — 3074F SYST BP LT 130 MM HG: CPT | Mod: CPTII,,, | Performed by: FAMILY MEDICINE

## 2025-03-11 PROCEDURE — 3078F DIAST BP <80 MM HG: CPT | Mod: CPTII,,, | Performed by: FAMILY MEDICINE

## 2025-03-11 PROCEDURE — 3008F BODY MASS INDEX DOCD: CPT | Mod: CPTII,,, | Performed by: FAMILY MEDICINE

## 2025-03-11 PROCEDURE — 80061 LIPID PANEL: CPT

## 2025-03-11 PROCEDURE — 3044F HG A1C LEVEL LT 7.0%: CPT | Mod: CPTII,,, | Performed by: FAMILY MEDICINE

## 2025-03-11 PROCEDURE — 80053 COMPREHEN METABOLIC PANEL: CPT

## 2025-03-11 PROCEDURE — 1159F MED LIST DOCD IN RCRD: CPT | Mod: CPTII,,, | Performed by: FAMILY MEDICINE

## 2025-03-11 PROCEDURE — 85025 COMPLETE CBC W/AUTO DIFF WBC: CPT

## 2025-03-11 PROCEDURE — 84443 ASSAY THYROID STIM HORMONE: CPT

## 2025-03-11 PROCEDURE — 1160F RVW MEDS BY RX/DR IN RCRD: CPT | Mod: CPTII,,, | Performed by: FAMILY MEDICINE

## 2025-03-11 PROCEDURE — 83036 HEMOGLOBIN GLYCOSYLATED A1C: CPT

## 2025-03-11 PROCEDURE — 99214 OFFICE O/P EST MOD 30 MIN: CPT | Mod: 25,,, | Performed by: FAMILY MEDICINE

## 2025-03-11 PROCEDURE — 99395 PREV VISIT EST AGE 18-39: CPT | Mod: ,,, | Performed by: FAMILY MEDICINE

## 2025-03-11 PROCEDURE — 36415 COLL VENOUS BLD VENIPUNCTURE: CPT

## 2025-03-11 RX ORDER — LISDEXAMFETAMINE DIMESYLATE 50 MG/1
50 CAPSULE ORAL EVERY MORNING
COMMUNITY

## 2025-03-11 RX ORDER — TRAZODONE HYDROCHLORIDE 50 MG/1
50 TABLET ORAL NIGHTLY
Qty: 90 TABLET | Refills: 1 | Status: SHIPPED | OUTPATIENT
Start: 2025-03-11 | End: 2026-03-11

## 2025-03-11 RX ORDER — ONDANSETRON 8 MG/1
8 TABLET, ORALLY DISINTEGRATING ORAL EVERY 8 HOURS PRN
Qty: 21 TABLET | Refills: 0 | Status: SHIPPED | OUTPATIENT
Start: 2025-03-11

## 2025-03-11 RX ORDER — HYDROXYZINE PAMOATE 25 MG/1
25 CAPSULE ORAL EVERY 8 HOURS PRN
Qty: 15 CAPSULE | Refills: 0 | Status: SHIPPED | OUTPATIENT
Start: 2025-03-11 | End: 2025-03-16

## 2025-03-11 NOTE — PROGRESS NOTES
Family Medicine    Patient ID: [id number]     Chief Complaint: Annual Exam (Wellness with labs)      HPI:     Stephania Cervantes is a 29 y.o. female here today for an annual wellness visit.     12/2024 visit pt referred to GI, Gen sx, placed on vistaril and amitriptyline at bedtime, and planned for repeat MRI pituitary tumor (last 11/24) and US liver cyst 5/25.  L sided pain: went to ER and got CT and labs, US after that showed a small ventral hernia, CT with L ovarian cysts otherwise no concerns.  Worse right after eating and with abdominal pressure  Pituitary tumor: MRI with concern of enlargement 11/2024  3. Hx IBS-D, now with constipation, early satiety, hx of esophageal stricture with nausea now and dysphasia, hx GERD but not taking PPIs.  4. Ovarian cysts: seeing OBGYN, no meds at this time, hx of OCP use  4. Incidental liver cyst: size not reported on imaging 2024, not seen on US 11/2024  5. Vascular abnormality on MRI 2024, no other details given on MRI, hx of HA, on and off blurry vision, arm and leg weakness  6. Chiari malformation: saw Neuro and did not like plan of care, no meds taken,   7. Inattention: getting evaluated for ADD/ADHD from psych due to meds like Zoloft and Wellbutrin not helping with anxiety or depression, no SI or HI  8. Anxiety: associated insomnia, mind racing, feeling tired all the time, no focal cause.      Health Maintenance         Date Due Completion Date    HIV Screening Never done ---    TETANUS VACCINE Never done ---    Pneumococcal Vaccines (Age 0-49) (1 of 2 - PCV) Never done ---    Pap Smear Never done ---    Influenza Vaccine (1) Never done ---    COVID-19 Vaccine (1 - 2024-25 season) Never done ---    RSV Vaccine (Age 60+ and Pregnant patients) (1 - 1-dose 75+ series) 07/16/2071 ---             Past Medical History:   Diagnosis Date    Chiari malformation type I     Endometriosis, unspecified     Esophageal stricture     Fibrocystic disease of both breasts     Fibroid  "tumor     Generalized anxiety disorder     GERD (gastroesophageal reflux disease)     Liver cyst     PCOS (polycystic ovarian syndrome)     Pituitary tumor     Umbilical hernia         Past Surgical History:   Procedure Laterality Date    CHOLECYSTECTOMY  2017    FRACTURE SURGERY  2015    Left ankle        Social History     Tobacco Use    Smoking status: Former     Types: Vaping with nicotine    Smokeless tobacco: Never   Substance and Sexual Activity    Alcohol use: Yes     Alcohol/week: 1.0 standard drink of alcohol     Types: 1 Cans of beer per week    Drug use: Never    Sexual activity: Yes     Partners: Female     Birth control/protection: None        Current Outpatient Medications   Medication Instructions    hydrOXYzine pamoate (VISTARIL) 25 mg, Oral, Every 8 hours PRN    ondansetron (ZOFRAN-ODT) 8 mg, Oral, Every 8 hours PRN    traZODone (DESYREL) 50 mg, Oral, Nightly    VYVANSE 50 mg, Every morning       Review of patient's allergies indicates:   Allergen Reactions    Penicillins Other (See Comments) and Hives        Patient Care Team:  Jeanine Jasso MD as PCP - General (Family Medicine)     Subjective:     Review of Systems    12 point review of systems conducted, negative except as stated in the history of present illness. See HPI for details.    Objective:     Visit Vitals  /75 (BP Location: Left arm)   Pulse 90   Ht 5' 7" (1.702 m)   Wt 77.1 kg (170 lb)   LMP 02/28/2025   BMI 26.63 kg/m²       Physical Exam  Vitals and nursing note reviewed.   Constitutional:       Appearance: Normal appearance.   HENT:      Mouth/Throat:      Mouth: Mucous membranes are moist.   Cardiovascular:      Rate and Rhythm: Normal rate and regular rhythm.   Pulmonary:      Effort: Pulmonary effort is normal.      Breath sounds: Normal breath sounds.   Neurological:      General: No focal deficit present.      Mental Status: She is alert.   Psychiatric:         Mood and Affect: Mood normal.         Labs Reviewed: "     Chemistry:  Lab Results   Component Value Date     03/11/2025    K 4.0 03/11/2025    BUN 9.8 03/11/2025    CREATININE 0.75 03/11/2025    EGFRNORACEVR >60 03/11/2025    GLUCOSE 99 03/11/2025    CALCIUM 9.0 03/11/2025    ALKPHOS 64 03/11/2025    LABPROT 7.8 03/11/2025    ALBUMIN 4.0 03/11/2025    BILIDIR 0.4 04/20/2021    IBILI 0.70 04/20/2021    AST 15 03/11/2025    ALT 18 03/11/2025    MG 2.20 10/08/2024    TSH 1.290 03/11/2025        Lab Results   Component Value Date    HGBA1C 5.0 03/11/2025        Hematology:  Lab Results   Component Value Date    WBC 6.74 03/11/2025    HGB 13.7 03/11/2025    HCT 40.5 03/11/2025     (H) 03/11/2025       Lipid Panel:  Lab Results   Component Value Date    CHOL 139 03/11/2025    HDL 44 03/11/2025    LDL 78.00 03/11/2025    TRIG 86 03/11/2025    TOTALCHOLEST 3 03/11/2025        Urine:  Lab Results   Component Value Date    APPEARANCEUA Turbid (A) 10/08/2024    SGUA 1.024 10/08/2024    PROTEINUA Trace (A) 10/08/2024    KETONESUA Negative 10/08/2024    LEUKOCYTESUR 500 (A) 10/08/2024    RBCUA 6-10 (A) 10/08/2024    WBCUA 11-20 (A) 10/08/2024    BACTERIA Trace (A) 10/08/2024    SQEPUA Few (A) 10/08/2024    HYALINECASTS None Seen 10/08/2024        Assessment:       ICD-10-CM ICD-9-CM   1. Wellness examination  Z00.00 V70.0   2. Generalized anxiety disorder  F41.1 300.02   3. Pituitary tumor  D49.7 239.7   4. Liver cyst  K76.89 573.8   5. Insomnia, unspecified type  G47.00 780.52   6. Nausea  R11.0 787.02        Plan:     Cervical Cancer Screening -      Breast Cancer Screening - Last Mammogram on     Colon Cancer Screening - Colonoscopy on     Osteoporosis Screening - Last DEXA on     Eye Exam - Recommend annually.    Dental Exam - Recommend biannual exams.     Vaccinations -   There is no immunization history on file for this patient.       1. Wellness examination  Assessment & Plan:  Reviewed wellness goals.  Keep follow up with GYN for female wellness.        2.  Generalized anxiety disorder  Assessment & Plan:  Trial of trazodone at bedtime and vistaril for anxiety attacks.     Orders:  -     hydrOXYzine pamoate (VISTARIL) 25 MG Cap; Take 1 capsule (25 mg total) by mouth every 8 (eight) hours as needed (as needed for itching).  Dispense: 15 capsule; Refill: 0    3. Pituitary tumor  Assessment & Plan:  Levels stable 2024, larger per MRI 2024 compared to 3 years before. MRI ordered to reassess.      Orders:  -     MRI Brain Without Contrast; Future; Expected date: 04/28/2025    4. Liver cyst  Assessment & Plan:  US ordered for recheck of liver cyst.     Orders:  -     US Abdomen Limited; Future; Expected date: 03/11/2025    5. Insomnia, unspecified type  -     traZODone (DESYREL) 50 MG tablet; Take 1 tablet (50 mg total) by mouth every evening.  Dispense: 90 tablet; Refill: 1    6. Nausea  Assessment & Plan:  Zofran for nausea as directed.     Orders:  -     ondansetron (ZOFRAN-ODT) 8 MG TbDL; Take 1 tablet (8 mg total) by mouth every 8 (eight) hours as needed (for nausea and vomitting).  Dispense: 21 tablet; Refill: 0         Follow up in about 3 months (around 6/11/2025) for Follow Up imaging. In addition to their scheduled follow up, the patient has also been instructed to follow up on as needed basis.     Future Appointments   Date Time Provider Department Center   6/11/2025  8:00 AM Jeanine Jasso MD [facility] JACKIE Jasso MD

## 2025-03-12 ENCOUNTER — RESULTS FOLLOW-UP (OUTPATIENT)
Dept: PRIMARY CARE CLINIC | Facility: CLINIC | Age: 30
End: 2025-03-12

## 2025-03-19 ENCOUNTER — LAB VISIT (OUTPATIENT)
Dept: LAB | Facility: HOSPITAL | Age: 30
End: 2025-03-19
Attending: OBSTETRICS & GYNECOLOGY
Payer: COMMERCIAL

## 2025-03-19 DIAGNOSIS — D25.9 LEIOMYOMA OF UTERUS, UNSPECIFIED: ICD-10-CM

## 2025-03-19 DIAGNOSIS — N83.292 COMPLEX CYST OF LEFT OVARY: ICD-10-CM

## 2025-03-19 DIAGNOSIS — R10.2 PELVIC PAIN SYNDROME: ICD-10-CM

## 2025-03-19 DIAGNOSIS — N92.6 IRREGULAR MENSTRUAL CYCLE: ICD-10-CM

## 2025-03-19 DIAGNOSIS — N92.6 IRREGULAR MENSTRUAL CYCLE: Primary | ICD-10-CM

## 2025-03-19 LAB
BASOPHILS # BLD AUTO: 0.04 X10(3)/MCL
BASOPHILS NFR BLD AUTO: 0.8 %
EOSINOPHIL # BLD AUTO: 0.12 X10(3)/MCL (ref 0–0.9)
EOSINOPHIL NFR BLD AUTO: 2.3 %
ERYTHROCYTE [DISTWIDTH] IN BLOOD BY AUTOMATED COUNT: 12.5 % (ref 11.5–17)
HCT VFR BLD AUTO: 40.3 % (ref 37–47)
HGB BLD-MCNC: 13.4 G/DL (ref 12–16)
IMM GRANULOCYTES # BLD AUTO: 0.01 X10(3)/MCL (ref 0–0.04)
IMM GRANULOCYTES NFR BLD AUTO: 0.2 %
LYMPHOCYTES # BLD AUTO: 2.2 X10(3)/MCL (ref 0.6–4.6)
LYMPHOCYTES NFR BLD AUTO: 42.9 %
MCH RBC QN AUTO: 29.5 PG (ref 27–31)
MCHC RBC AUTO-ENTMCNC: 33.3 G/DL (ref 33–36)
MCV RBC AUTO: 88.8 FL (ref 80–94)
MONOCYTES # BLD AUTO: 0.5 X10(3)/MCL (ref 0.1–1.3)
MONOCYTES NFR BLD AUTO: 9.7 %
NEUTROPHILS # BLD AUTO: 2.26 X10(3)/MCL (ref 2.1–9.2)
NEUTROPHILS NFR BLD AUTO: 44.1 %
NRBC BLD AUTO-RTO: 0 %
PLATELET # BLD AUTO: 364 X10(3)/MCL (ref 130–400)
PMV BLD AUTO: 10.5 FL (ref 7.4–10.4)
RBC # BLD AUTO: 4.54 X10(6)/MCL (ref 4.2–5.4)
WBC # BLD AUTO: 5.13 X10(3)/MCL (ref 4.5–11.5)

## 2025-03-19 PROCEDURE — 85025 COMPLETE CBC W/AUTO DIFF WBC: CPT

## 2025-03-19 PROCEDURE — 36415 COLL VENOUS BLD VENIPUNCTURE: CPT

## 2025-03-27 ENCOUNTER — DOCUMENTATION ONLY (OUTPATIENT)
Facility: CLINIC | Age: 30
End: 2025-03-27
Payer: COMMERCIAL

## 2025-03-27 RX ORDER — HYDROXYZINE PAMOATE 25 MG/1
25 CAPSULE ORAL
COMMUNITY

## 2025-03-27 RX ORDER — MEDROXYPROGESTERONE ACETATE 5 MG/1
5 TABLET ORAL DAILY
COMMUNITY

## 2025-04-01 ENCOUNTER — HOSPITAL ENCOUNTER (OUTPATIENT)
Facility: HOSPITAL | Age: 30
Discharge: HOME | End: 2025-04-02
Attending: OBSTETRICS & GYNECOLOGY | Admitting: OBSTETRICS & GYNECOLOGY
Payer: COMMERCIAL

## 2025-04-01 ENCOUNTER — ANESTHESIA (OUTPATIENT)
Dept: SURGERY | Facility: HOSPITAL | Age: 30
End: 2025-04-01
Payer: COMMERCIAL

## 2025-04-01 ENCOUNTER — ANESTHESIA EVENT (OUTPATIENT)
Dept: SURGERY | Facility: HOSPITAL | Age: 30
End: 2025-04-01
Payer: COMMERCIAL

## 2025-04-01 DIAGNOSIS — N83.292 COMPLEX CYST OF LEFT OVARY: ICD-10-CM

## 2025-04-01 DIAGNOSIS — D25.9 UTERINE LEIOMYOMA, UNSPECIFIED LOCATION: ICD-10-CM

## 2025-04-01 DIAGNOSIS — Z98.890 S/P EXPLORATORY LAPAROTOMY: Primary | ICD-10-CM

## 2025-04-01 PROBLEM — R10.2 PELVIC PAIN: Status: ACTIVE | Noted: 2025-04-01

## 2025-04-01 PROBLEM — N80.9 ENDOMETRIOSIS: Status: ACTIVE | Noted: 2025-04-01

## 2025-04-01 PROBLEM — N80.129 ENDOMETRIOMA: Status: ACTIVE | Noted: 2025-04-01

## 2025-04-01 LAB
B-HCG UR QL: NEGATIVE
CTP QC/QA: YES

## 2025-04-01 PROCEDURE — 63600175 PHARM REV CODE 636 W HCPCS: Mod: JZ,TB | Performed by: OBSTETRICS & GYNECOLOGY

## 2025-04-01 PROCEDURE — 25000003 PHARM REV CODE 250: Performed by: NURSE ANESTHETIST, CERTIFIED REGISTERED

## 2025-04-01 PROCEDURE — 27201423 OPTIME MED/SURG SUP & DEVICES STERILE SUPPLY: Performed by: OBSTETRICS & GYNECOLOGY

## 2025-04-01 PROCEDURE — 37000008 HC ANESTHESIA 1ST 15 MINUTES: Performed by: OBSTETRICS & GYNECOLOGY

## 2025-04-01 PROCEDURE — 37000009 HC ANESTHESIA EA ADD 15 MINS: Performed by: OBSTETRICS & GYNECOLOGY

## 2025-04-01 PROCEDURE — 63600175 PHARM REV CODE 636 W HCPCS

## 2025-04-01 PROCEDURE — 25000003 PHARM REV CODE 250: Performed by: OBSTETRICS & GYNECOLOGY

## 2025-04-01 PROCEDURE — 71000015 HC POSTOP RECOV 1ST HR: Performed by: OBSTETRICS & GYNECOLOGY

## 2025-04-01 PROCEDURE — 36000707: Performed by: OBSTETRICS & GYNECOLOGY

## 2025-04-01 PROCEDURE — 71000033 HC RECOVERY, INTIAL HOUR: Performed by: OBSTETRICS & GYNECOLOGY

## 2025-04-01 PROCEDURE — 63600175 PHARM REV CODE 636 W HCPCS: Performed by: ANESTHESIOLOGY

## 2025-04-01 PROCEDURE — 63600175 PHARM REV CODE 636 W HCPCS: Performed by: NURSE ANESTHETIST, CERTIFIED REGISTERED

## 2025-04-01 PROCEDURE — 25000003 PHARM REV CODE 250: Performed by: ANESTHESIOLOGY

## 2025-04-01 PROCEDURE — 63600175 PHARM REV CODE 636 W HCPCS: Performed by: OBSTETRICS & GYNECOLOGY

## 2025-04-01 PROCEDURE — 81025 URINE PREGNANCY TEST: CPT | Performed by: OBSTETRICS & GYNECOLOGY

## 2025-04-01 PROCEDURE — C1765 ADHESION BARRIER: HCPCS | Performed by: OBSTETRICS & GYNECOLOGY

## 2025-04-01 PROCEDURE — 36000706: Performed by: OBSTETRICS & GYNECOLOGY

## 2025-04-01 DEVICE — BARRIER INTERCEED 3X4 ST DIS: Type: IMPLANTABLE DEVICE | Site: ABDOMEN | Status: FUNCTIONAL

## 2025-04-01 RX ORDER — METHOCARBAMOL 100 MG/ML
1000 INJECTION, SOLUTION INTRAMUSCULAR; INTRAVENOUS ONCE AS NEEDED
Status: COMPLETED | OUTPATIENT
Start: 2025-04-01 | End: 2025-04-01

## 2025-04-01 RX ORDER — ONDANSETRON 4 MG/1
4 TABLET, ORALLY DISINTEGRATING ORAL EVERY 8 HOURS PRN
Status: DISCONTINUED | OUTPATIENT
Start: 2025-04-01 | End: 2025-04-02 | Stop reason: HOSPADM

## 2025-04-01 RX ORDER — LIDOCAINE HYDROCHLORIDE 10 MG/ML
INJECTION, SOLUTION EPIDURAL; INFILTRATION; INTRACAUDAL; PERINEURAL
Status: DISCONTINUED | OUTPATIENT
Start: 2025-04-01 | End: 2025-04-01

## 2025-04-01 RX ORDER — GLUCAGON 1 MG
1 KIT INJECTION
Status: DISCONTINUED | OUTPATIENT
Start: 2025-04-01 | End: 2025-04-01 | Stop reason: HOSPADM

## 2025-04-01 RX ORDER — ONDANSETRON HYDROCHLORIDE 2 MG/ML
INJECTION, SOLUTION INTRAMUSCULAR; INTRAVENOUS
Status: DISCONTINUED | OUTPATIENT
Start: 2025-04-01 | End: 2025-04-01

## 2025-04-01 RX ORDER — OXYCODONE AND ACETAMINOPHEN 10; 325 MG/1; MG/1
1 TABLET ORAL EVERY 4 HOURS PRN
Status: DISCONTINUED | OUTPATIENT
Start: 2025-04-01 | End: 2025-04-02 | Stop reason: HOSPADM

## 2025-04-01 RX ORDER — BUPIVACAINE HYDROCHLORIDE 5 MG/ML
INJECTION, SOLUTION EPIDURAL; INTRACAUDAL; PERINEURAL
Status: DISPENSED
Start: 2025-04-01 | End: 2025-04-02

## 2025-04-01 RX ORDER — MORPHINE SULFATE 4 MG/ML
4 INJECTION, SOLUTION INTRAMUSCULAR; INTRAVENOUS
Status: DISCONTINUED | OUTPATIENT
Start: 2025-04-01 | End: 2025-04-02 | Stop reason: HOSPADM

## 2025-04-01 RX ORDER — HALOPERIDOL LACTATE 5 MG/ML
0.5 INJECTION, SOLUTION INTRAMUSCULAR EVERY 10 MIN PRN
Status: DISCONTINUED | OUTPATIENT
Start: 2025-04-01 | End: 2025-04-01 | Stop reason: HOSPADM

## 2025-04-01 RX ORDER — KETOROLAC TROMETHAMINE 30 MG/ML
30 INJECTION, SOLUTION INTRAMUSCULAR; INTRAVENOUS EVERY 8 HOURS
Status: COMPLETED | OUTPATIENT
Start: 2025-04-01 | End: 2025-04-02

## 2025-04-01 RX ORDER — HYDROMORPHONE HYDROCHLORIDE 2 MG/ML
0.4 INJECTION, SOLUTION INTRAMUSCULAR; INTRAVENOUS; SUBCUTANEOUS EVERY 5 MIN PRN
Status: ACTIVE | OUTPATIENT
Start: 2025-04-01 | End: 2025-04-01

## 2025-04-01 RX ORDER — HYDROMORPHONE HYDROCHLORIDE 2 MG/ML
INJECTION, SOLUTION INTRAMUSCULAR; INTRAVENOUS; SUBCUTANEOUS
Status: DISCONTINUED | OUTPATIENT
Start: 2025-04-01 | End: 2025-04-01

## 2025-04-01 RX ORDER — FENTANYL CITRATE 50 UG/ML
INJECTION, SOLUTION INTRAMUSCULAR; INTRAVENOUS
Status: DISCONTINUED | OUTPATIENT
Start: 2025-04-01 | End: 2025-04-01

## 2025-04-01 RX ORDER — ACETAMINOPHEN 10 MG/ML
INJECTION, SOLUTION INTRAVENOUS
Status: DISCONTINUED | OUTPATIENT
Start: 2025-04-01 | End: 2025-04-01

## 2025-04-01 RX ORDER — DIPHENHYDRAMINE HYDROCHLORIDE 50 MG/ML
25 INJECTION, SOLUTION INTRAMUSCULAR; INTRAVENOUS EVERY 4 HOURS PRN
Status: DISCONTINUED | OUTPATIENT
Start: 2025-04-01 | End: 2025-04-02 | Stop reason: HOSPADM

## 2025-04-01 RX ORDER — OXYCODONE AND ACETAMINOPHEN 5; 325 MG/1; MG/1
1 TABLET ORAL EVERY 4 HOURS PRN
Status: DISCONTINUED | OUTPATIENT
Start: 2025-04-01 | End: 2025-04-02 | Stop reason: HOSPADM

## 2025-04-01 RX ORDER — IBUPROFEN 600 MG/1
600 TABLET ORAL EVERY 6 HOURS
Status: DISCONTINUED | OUTPATIENT
Start: 2025-04-02 | End: 2025-04-02 | Stop reason: HOSPADM

## 2025-04-01 RX ORDER — HYDROMORPHONE HYDROCHLORIDE 2 MG/ML
0.2 INJECTION, SOLUTION INTRAMUSCULAR; INTRAVENOUS; SUBCUTANEOUS EVERY 5 MIN PRN
Status: DISCONTINUED | OUTPATIENT
Start: 2025-04-01 | End: 2025-04-01

## 2025-04-01 RX ORDER — OXYCODONE AND ACETAMINOPHEN 5; 325 MG/1; MG/1
1 TABLET ORAL
Status: DISCONTINUED | OUTPATIENT
Start: 2025-04-01 | End: 2025-04-01 | Stop reason: HOSPADM

## 2025-04-01 RX ORDER — DIPHENHYDRAMINE HCL 25 MG
25 CAPSULE ORAL EVERY 4 HOURS PRN
Status: DISCONTINUED | OUTPATIENT
Start: 2025-04-01 | End: 2025-04-02 | Stop reason: HOSPADM

## 2025-04-01 RX ORDER — CLINDAMYCIN PHOSPHATE 900 MG/50ML
900 INJECTION, SOLUTION INTRAVENOUS
Status: COMPLETED | OUTPATIENT
Start: 2025-04-01 | End: 2025-04-01

## 2025-04-01 RX ORDER — GENTAMICIN 40 MG/ML
5 INJECTION, SOLUTION INTRAMUSCULAR; INTRAVENOUS ONCE
Status: DISCONTINUED | OUTPATIENT
Start: 2025-04-01 | End: 2025-04-01

## 2025-04-01 RX ORDER — KETOROLAC TROMETHAMINE 30 MG/ML
15 INJECTION, SOLUTION INTRAMUSCULAR; INTRAVENOUS EVERY 8 HOURS PRN
Status: DISCONTINUED | OUTPATIENT
Start: 2025-04-01 | End: 2025-04-01 | Stop reason: HOSPADM

## 2025-04-01 RX ORDER — SCOPOLAMINE 1 MG/3D
1 PATCH, EXTENDED RELEASE TRANSDERMAL
Status: CANCELLED | OUTPATIENT
Start: 2025-04-01

## 2025-04-01 RX ORDER — HYDRALAZINE HYDROCHLORIDE 20 MG/ML
10 INJECTION INTRAMUSCULAR; INTRAVENOUS ONCE
Status: DISCONTINUED | OUTPATIENT
Start: 2025-04-01 | End: 2025-04-01 | Stop reason: HOSPADM

## 2025-04-01 RX ORDER — MUPIROCIN 20 MG/G
OINTMENT TOPICAL 2 TIMES DAILY
Status: DISCONTINUED | OUTPATIENT
Start: 2025-04-01 | End: 2025-04-02 | Stop reason: HOSPADM

## 2025-04-01 RX ORDER — BISACODYL 10 MG/1
10 SUPPOSITORY RECTAL DAILY PRN
Status: DISCONTINUED | OUTPATIENT
Start: 2025-04-01 | End: 2025-04-02 | Stop reason: HOSPADM

## 2025-04-01 RX ORDER — SCOPOLAMINE 1 MG/3D
1 PATCH, EXTENDED RELEASE TRANSDERMAL
Status: DISCONTINUED | OUTPATIENT
Start: 2025-04-01 | End: 2025-04-02 | Stop reason: HOSPADM

## 2025-04-01 RX ORDER — PROPOFOL 10 MG/ML
VIAL (ML) INTRAVENOUS
Status: DISCONTINUED | OUTPATIENT
Start: 2025-04-01 | End: 2025-04-01

## 2025-04-01 RX ORDER — DEXAMETHASONE SODIUM PHOSPHATE 4 MG/ML
INJECTION, SOLUTION INTRA-ARTICULAR; INTRALESIONAL; INTRAMUSCULAR; INTRAVENOUS; SOFT TISSUE
Status: DISCONTINUED | OUTPATIENT
Start: 2025-04-01 | End: 2025-04-01

## 2025-04-01 RX ORDER — AMOXICILLIN 250 MG
1 CAPSULE ORAL 2 TIMES DAILY
Status: DISCONTINUED | OUTPATIENT
Start: 2025-04-01 | End: 2025-04-02 | Stop reason: HOSPADM

## 2025-04-01 RX ORDER — ROCURONIUM BROMIDE 10 MG/ML
INJECTION, SOLUTION INTRAVENOUS
Status: DISCONTINUED | OUTPATIENT
Start: 2025-04-01 | End: 2025-04-01

## 2025-04-01 RX ORDER — ACETAMINOPHEN 10 MG/ML
INJECTION, SOLUTION INTRAVENOUS
Status: COMPLETED
Start: 2025-04-01 | End: 2025-04-01

## 2025-04-01 RX ORDER — VASOPRESSIN 20 [USP'U]/ML
INJECTION, SOLUTION INTRAMUSCULAR; SUBCUTANEOUS
Status: DISCONTINUED | OUTPATIENT
Start: 2025-04-01 | End: 2025-04-01 | Stop reason: HOSPADM

## 2025-04-01 RX ORDER — MIDAZOLAM HYDROCHLORIDE 1 MG/ML
2 INJECTION INTRAMUSCULAR; INTRAVENOUS ONCE
Status: COMPLETED | OUTPATIENT
Start: 2025-04-01 | End: 2025-04-01

## 2025-04-01 RX ORDER — VASOPRESSIN 20 [USP'U]/ML
INJECTION, SOLUTION INTRAMUSCULAR; SUBCUTANEOUS
Status: DISPENSED
Start: 2025-04-01 | End: 2025-04-02

## 2025-04-01 RX ORDER — MIDAZOLAM HYDROCHLORIDE 2 MG/2ML
INJECTION, SOLUTION INTRAMUSCULAR; INTRAVENOUS
Status: COMPLETED
Start: 2025-04-01 | End: 2025-04-01

## 2025-04-01 RX ORDER — SODIUM CHLORIDE, SODIUM LACTATE, POTASSIUM CHLORIDE, CALCIUM CHLORIDE 600; 310; 30; 20 MG/100ML; MG/100ML; MG/100ML; MG/100ML
INJECTION, SOLUTION INTRAVENOUS CONTINUOUS
Status: DISCONTINUED | OUTPATIENT
Start: 2025-04-01 | End: 2025-04-02 | Stop reason: HOSPADM

## 2025-04-01 RX ORDER — BUPIVACAINE HYDROCHLORIDE 5 MG/ML
INJECTION, SOLUTION EPIDURAL; INTRACAUDAL; PERINEURAL
Status: DISCONTINUED | OUTPATIENT
Start: 2025-04-01 | End: 2025-04-01 | Stop reason: HOSPADM

## 2025-04-01 RX ADMIN — DEXMEDETOMIDINE HYDROCHLORIDE 4 MCG: 400 INJECTION INTRAVENOUS at 01:04

## 2025-04-01 RX ADMIN — MIDAZOLAM HYDROCHLORIDE 2 MG: 1 INJECTION INTRAMUSCULAR; INTRAVENOUS at 12:04

## 2025-04-01 RX ADMIN — HYDROMORPHONE HYDROCHLORIDE 0.4 MG: 2 INJECTION, SOLUTION INTRAMUSCULAR; INTRAVENOUS; SUBCUTANEOUS at 01:04

## 2025-04-01 RX ADMIN — DEXMEDETOMIDINE HYDROCHLORIDE 6 MCG: 400 INJECTION INTRAVENOUS at 12:04

## 2025-04-01 RX ADMIN — HYDROMORPHONE HYDROCHLORIDE 0.4 MG: 2 INJECTION, SOLUTION INTRAMUSCULAR; INTRAVENOUS; SUBCUTANEOUS at 12:04

## 2025-04-01 RX ADMIN — KETOROLAC TROMETHAMINE 30 MG: 30 INJECTION, SOLUTION INTRAMUSCULAR; INTRAVENOUS at 09:04

## 2025-04-01 RX ADMIN — ACETAMINOPHEN 1000 MG: 10 INJECTION, SOLUTION INTRAVENOUS at 12:04

## 2025-04-01 RX ADMIN — SCOPOLAMINE 1 PATCH: 1 PATCH TRANSDERMAL at 12:04

## 2025-04-01 RX ADMIN — ONDANSETRON 4 MG: 4 TABLET, ORALLY DISINTEGRATING ORAL at 07:04

## 2025-04-01 RX ADMIN — SODIUM CHLORIDE, POTASSIUM CHLORIDE, SODIUM LACTATE AND CALCIUM CHLORIDE: 600; 310; 30; 20 INJECTION, SOLUTION INTRAVENOUS at 12:04

## 2025-04-01 RX ADMIN — SUGAMMADEX 200 MG: 100 INJECTION, SOLUTION INTRAVENOUS at 01:04

## 2025-04-01 RX ADMIN — ROCURONIUM BROMIDE 50 MG: 10 INJECTION, SOLUTION INTRAVENOUS at 12:04

## 2025-04-01 RX ADMIN — METHOCARBAMOL 1000 MG: 100 INJECTION INTRAMUSCULAR; INTRAVENOUS at 02:04

## 2025-04-01 RX ADMIN — KETOROLAC TROMETHAMINE 30 MG: 30 INJECTION, SOLUTION INTRAMUSCULAR; INTRAVENOUS at 03:04

## 2025-04-01 RX ADMIN — GENTAMICIN SULFATE 308 MG: 40 INJECTION, SOLUTION INTRAMUSCULAR; INTRAVENOUS at 02:04

## 2025-04-01 RX ADMIN — FENTANYL CITRATE 100 MCG: 50 INJECTION, SOLUTION INTRAMUSCULAR; INTRAVENOUS at 12:04

## 2025-04-01 RX ADMIN — SENNOSIDES AND DOCUSATE SODIUM 1 TABLET: 8.6; 5 TABLET ORAL at 09:04

## 2025-04-01 RX ADMIN — LIDOCAINE HYDROCHLORIDE 50 MG: 10 INJECTION, SOLUTION EPIDURAL; INFILTRATION; INTRACAUDAL; PERINEURAL at 12:04

## 2025-04-01 RX ADMIN — PROPOFOL 150 MG: 10 INJECTION, EMULSION INTRAVENOUS at 12:04

## 2025-04-01 RX ADMIN — ONDANSETRON 4 MG: 2 INJECTION INTRAMUSCULAR; INTRAVENOUS at 01:04

## 2025-04-01 RX ADMIN — DEXMEDETOMIDINE HYDROCHLORIDE 6 MCG: 400 INJECTION INTRAVENOUS at 01:04

## 2025-04-01 RX ADMIN — DEXAMETHASONE SODIUM PHOSPHATE 4 MG: 4 INJECTION, SOLUTION INTRA-ARTICULAR; INTRALESIONAL; INTRAMUSCULAR; INTRAVENOUS; SOFT TISSUE at 12:04

## 2025-04-01 RX ADMIN — CLINDAMYCIN PHOSPHATE 900 MG: 900 INJECTION, SOLUTION INTRAVENOUS at 12:04

## 2025-04-01 RX ADMIN — MIDAZOLAM HYDROCHLORIDE 2 MG: 1 INJECTION, SOLUTION INTRAMUSCULAR; INTRAVENOUS at 12:04

## 2025-04-01 NOTE — CARE UPDATE
Patient awakened,durbin,rejected oral airway, oriented/reassured her, she denied c/o, exchanging well.

## 2025-04-01 NOTE — CARE UPDATE
Received patient from the OR, she is asleep, oral airway in place, chin lift not necessary at present-nurse remains at bedside with constant assessment and observation, respirations full-regular-deep-clear, hob up 30 degrees.

## 2025-04-01 NOTE — ANESTHESIA PROCEDURE NOTES
Intubation    Date/Time: 4/1/2025 12:35 PM    Performed by: Rosamaria Hedrick CRNA  Authorized by: Robb Beebe MD    Intubation:     Induction:  Intravenous    Intubated:  Postinduction    Mask Ventilation:  Easy mask    Attempts:  1    Attempted By:  CRNA    Blade:  Jasso 2    Laryngeal View Grade: Grade I - full view of cords      Difficult Airway Encountered?: No      Complications:  None    Airway Device:  Oral endotracheal tube    Airway Device Size:  7.0    Style/Cuff Inflation:  Cuffed (inflated to minimal occlusive pressure)    Tube secured:  21    Secured at:  The lips    Placement Verified By:  Capnometry    Complicating Factors:  None    Findings Post-Intubation:  BS equal bilateral and atraumatic/condition of teeth unchanged

## 2025-04-01 NOTE — OP NOTE
GYN Operative Report  Date: 4/1/2025    Pre-op Diagnosis:   Pelvic pain  AUB  Uterine fibroid  Left adnexal mass    Postop Diagnosis:   Stage 4 Endometriosis  Left ovarian endometrioma  Obliterated cul-de-sac    Procedure:  Exploratory laparotomy  Left ovarian cystectomy    Surgeon:Rosamaria Whitaker MD    Assistant: Emile Simpson MD    Anesthesia: General    Complications: None    EBL: 50 cc    Urine Output: 200 cc    Specimen: Left ovarian cyst    Findings: Exam under anesthesia reveals normal size uterus with minimal mobility. Intraabdominal survey revealved dense pelvic adhesions.  Left ovary with 5 cm mass.  Smooth capsule containing chocolate material.  Densely adhered to the left side wall.  Rectum adhered to the posterior uterus and inferior to left adhexa.  Ureter or sidewall unable to be visualized on left side.  Right ovary appeared normal sized.  Bilateral fallopian tubes appeared dilated with decreased mobility.  The posterior uterus was densely adhered to the rectum. Fibroid unable to be visualized or palpated.  Cul-de-sac obliterated.  Appendix appeared normal.      Procedure: The patient was taken to the operating room with IV fluids running and SCDs applied to the lower extremities. She received preoperative antibiotic prophylaxis with Gent and clinda.  General anesthesia was obtained without difficulty. She was prepped and draped in the dorsal supine position.     A pfannenstiel skin incision was made with the scalpel.  The incision was carried down to the fascia with the bovie.  The fascia was incised and extended laterally.  The superior aspect of the fascia was grasped with the Kocher clamps and the underlying rectus muscle was dissected off sharply.  In a similar fashion, the inferior aspect of fascia was elevated with the kocher and rectus and pyramidalis were dissected off.  Hemostasis was acheived with the bovie.  The rectus muscle was  in the midline down to the level of the pubic  symphysis.  Pre-peritoneal fatty tissue was bluntly dissected to expose the peritoneum which was entered sharply.  The peritoneal incision was extended superiorly and inferiorly to the bladder reflection with scissors.  Intraabdominial survey revealed the above mentioned findings. Ricky retractor was inserted an bowel was packed away with moist lap sponges.    The left adnexal mass was identified and noted to have filmy adhesions to sidewall, uterus and bowel.  The cyst was identifed and retracted with a Union. The filmy adhesions were dissected with bluntly and with the Bovie for visualization.  The cyst capsule was incised with the bovie.  The cyst was grasped with the lasha and dissected from the ovarian capsule.  The cyst was ruptured and chocolate fluid was noted.  The cyst wall was excised from the ovary.  Bovie was used for hemostasis.  Excess capsule trimmed.  The ovarian bed was closed with 2-0 vicryl closed in running lock fashion.  The pelvis was further inspected with findings as above.  The uterus was densely adhered to the rectosigmoid that limited visualization and mobility.  The decision was made to not proceed further with attempt for myomectomy.  The pelvis was irrigated with warm normal saline.  Surgicel was dispersed over the operative field after all areas were noted to be hemostatic.  Ricky retractor and laparotomy sponges removed.  All instruments were removed from the abdomen and the counts were correct x 2.  Peritoneum was closed with 2-0 vicryl in a continuous running fashion.  The fasical layer was closed with 0 vicryl x 2 in a running stitch.  Subcutaneous tissue was reapproximated with 2-0 plain cut and skin was closed using 4-0 monocryl in a subcuticular fashion.  The patient tolerated procedure well. All sponge, lap and needle counts were correct x2.   The patient was taken to recovery in stable condition.  Findings reviewed with her wife and mother.

## 2025-04-01 NOTE — PLAN OF CARE
Problem: Comorbidity Management  Goal: Maintenance of Behavioral Health Symptom Control  Outcome: Progressing     Problem: Fall Injury Risk  Goal: Absence of Fall and Fall-Related Injury  Outcome: Progressing     Problem: Infection  Goal: Absence of Infection Signs and Symptoms  Outcome: Progressing     Problem: Pain Acute  Goal: Optimal Pain Control and Function  Outcome: Progressing     Problem: Adult Inpatient Plan of Care  Goal: Plan of Care Review  Outcome: Progressing  Goal: Patient-Specific Goal (Individualized)  Outcome: Progressing  Goal: Absence of Hospital-Acquired Illness or Injury  Outcome: Progressing  Goal: Optimal Comfort and Wellbeing  Outcome: Progressing  Goal: Readiness for Transition of Care  Outcome: Progressing     Problem: Wound  Goal: Optimal Coping  Outcome: Progressing  Goal: Optimal Functional Ability  Outcome: Progressing  Goal: Absence of Infection Signs and Symptoms  Outcome: Progressing  Goal: Improved Oral Intake  Outcome: Progressing  Goal: Optimal Pain Control and Function  Outcome: Progressing  Goal: Skin Health and Integrity  Outcome: Progressing  Goal: Optimal Wound Healing  Outcome: Progressing     Problem: Comorbidity Management  Goal: Maintenance of Behavioral Health Symptom Control  Outcome: Progressing     Problem: Fall Injury Risk  Goal: Absence of Fall and Fall-Related Injury  Outcome: Progressing     Problem: Infection  Goal: Absence of Infection Signs and Symptoms  Outcome: Progressing     Problem: Pain Acute  Goal: Optimal Pain Control and Function  Outcome: Progressing     Problem: Adult Inpatient Plan of Care  Goal: Plan of Care Review  Outcome: Progressing  Goal: Patient-Specific Goal (Individualized)  Outcome: Progressing  Goal: Absence of Hospital-Acquired Illness or Injury  Outcome: Progressing  Goal: Optimal Comfort and Wellbeing  Outcome: Progressing  Goal: Readiness for Transition of Care  Outcome: Progressing     Problem: Wound  Goal: Optimal  Coping  Outcome: Progressing  Goal: Optimal Functional Ability  Outcome: Progressing  Goal: Absence of Infection Signs and Symptoms  Outcome: Progressing  Goal: Improved Oral Intake  Outcome: Progressing  Goal: Optimal Pain Control and Function  Outcome: Progressing  Goal: Skin Health and Integrity  Outcome: Progressing  Goal: Optimal Wound Healing  Outcome: Progressing

## 2025-04-01 NOTE — H&P
Pre-Operative History and Physical   Obstetrics and Gynecology    Stephania Cervantes is a 29 y.o. female G0 with pelvic pain, AUB, uterine fibroid and left ovarian cyst for preop examination.  She is scheduled for a Abdominal myomectomy and cystectomy on 4/1/2025.    ROS:  GENERAL: Denies weight gain or weight loss. Feeling well overall.   SKIN: Denies rash or lesions.   HEAD: Denies head injury or headache.   NODES: Denies enlarged lymph nodes.   CHEST: Denies chest pain or shortness of breath.   CARDIOVASCULAR: Denies palpitations or left sided chest pain.   ABDOMEN: No abdominal pain, constipation, diarrhea, nausea, vomiting or rectal bleeding.   URINARY: No frequency, dysuria, hematuria, or burning on urination.  REPRODUCTIVE: See HPI.   BREASTS: denies pain, lumps, or nipple discharge.   HEMATOLOGIC: No easy bruisability or excessive bleeding with the exception of menstrual cycles.  MUSCULOSKELETAL: Denies joint pain or swelling.   NEUROLOGIC: Denies syncope or weakness.   PSYCHIATRIC: Denies depression, anxiety or mood swings.    Past Medical History:   Diagnosis Date    Chiari malformation type I     Endometriosis, unspecified     Esophageal stricture     Fibrocystic disease of both breasts     Fibroid tumor     Generalized anxiety disorder     GERD (gastroesophageal reflux disease)     Liver cyst     PCOS (polycystic ovarian syndrome)     Pituitary tumor     PONV (postoperative nausea and vomiting)     Umbilical hernia      Past Surgical History:   Procedure Laterality Date    CHOLECYSTECTOMY  2017    FRACTURE SURGERY  2015    Left ankle     Family History   Problem Relation Name Age of Onset    Arthritis Mother Marybeth     Depression Mother Marybeth     Diabetes Mother Marybeth     Hypertension Mother Marybeth     Vision loss Mother Marybeth     Depression Maternal Grandmother Patricia     Hypertension Maternal Grandmother Patricia     Asthma Maternal Grandfather Bernardo     Diabetes Maternal Grandfather Bernardo      Lung cancer Paternal Grandfather Ar     COPD Maternal Aunt Letty     Depression Maternal Aunt Letty     Diabetes Maternal Aunt Letty     Hypertension Maternal Aunt Letty      Review of patient's allergies indicates:   Allergen Reactions    Penicillins Other (See Comments) and Hives     Current Medications[1]  Outpatient Medications Marked as Taking for the 4/1/25 encounter (Hospital Encounter)   Medication Sig Dispense Refill    hydrOXYzine pamoate (VISTARIL) 25 MG Cap Take 25 mg by mouth as needed.      ondansetron (ZOFRAN-ODT) 8 MG TbDL Take 1 tablet (8 mg total) by mouth every 8 (eight) hours as needed (for nausea and vomitting). 21 tablet 0    VYVANSE 50 mg capsule Take 50 mg by mouth every morning.       Social History[2]      Vitals:    04/01/25 1008   BP:    Pulse:    Resp: 18   Temp:      General Appearance: no acute distress, alert and oriented   Cardiovascular Exam: regular rate and rhythm  Gastrointestinal Exam: clear to auscultation bilaterally    H&H: 13/40    Assessment: Pelvic pain, AUB, Uterine fibroid and ovarian cyst    Plan:   To OR for scheduled procedure  I have discussed the risks, benefits, indications, and alternatives of the procedure in detail.  The patient verbalizes her understanding.  All questions answered.  Consents signed.  The patient agrees to proceed to proceed as planned.        [1]   Current Facility-Administered Medications:     acetaminophen (OFIRMEV) 1,000 mg/100 mL (10 mg/mL) injection, , , ,     BUPivacaine (PF) 0.5% (5 mg/mL) (MARCAINE) 0.5 % (5 mg/mL) injection, , , ,     clindamycin in D5W 900 mg/50 mL IVPB 900 mg, 900 mg, Intravenous, Once Pre-Op, Rosamaria Whitaker MD    gentamicin (GARAMYCIN) 308 mg in 0.9% NaCl 100 mL IVPB, 5 mg/kg (Ideal), Intravenous, Once, Rosamaria Whitaker MD    Pharmacy to dose Aminoglycosides consult, , , Once **AND** gentamicin - pharmacy to dose, , Intravenous, pharmacy to manage frequency, Rosamaria Whitaker MD    scopolamine  1.3-1.5 mg (1 mg over 3 days) 1 patch, 1 patch, Transdermal, Q3 Days, Robb Beebe MD    vasopressin (PITRESSIN) 20 unit/mL injection, , , ,   [2]   Social History  Tobacco Use    Smoking status: Former     Types: Vaping with nicotine     Start date:      Quit date:      Years since quittin.2    Smokeless tobacco: Never   Substance Use Topics    Alcohol use: Not Currently     Alcohol/week: 1.0 standard drink of alcohol     Types: 1 Cans of beer per week    Drug use: Never

## 2025-04-01 NOTE — ANESTHESIA POSTPROCEDURE EVALUATION
Anesthesia Post Evaluation    Patient: Stephania Cervantes    Procedure(s) Performed: Procedure(s) (LRB):  EXCISION, CYST, OVARY / Left open (Left)  LYSIS, ADHESIONS, OPEN    Final Anesthesia Type: general      Patient location during evaluation: PACU  Patient participation: Yes- Able to Participate  Level of consciousness: awake and alert and oriented  Post-procedure vital signs: reviewed and stable  Pain management: adequate  Airway patency: patent    PONV status at discharge: No PONV  Anesthetic complications: no      Cardiovascular status: hemodynamically stable  Respiratory status: unassisted, spontaneous ventilation and room air  Hydration status: euvolemic  Follow-up not needed.              Vitals Value Taken Time   /67 04/01/25 15:38   Temp 36.8 °C (98.3 °F) 04/01/25 14:53   Pulse 95 04/01/25 15:38   Resp 16 04/01/25 15:38   SpO2 95 % 04/01/25 15:38         Event Time   Out of Recovery 14:48:00         Pain/Tricia Score: Pain Rating Prior to Med Admin: 8 (4/1/2025  3:06 PM)  Pain Rating Post Med Admin: 2 (4/1/2025  3:36 PM)  Tricia Score: 10 (4/1/2025  5:04 PM)

## 2025-04-01 NOTE — ANESTHESIA PREPROCEDURE EVALUATION
"                                                                                                             04/01/2025  Stephania Cervantes is a 29 y.o., female     Height: 5' 7" (1.702 m) (04/01/25)   Weight: 74.3 kg (163 lb 12.8 oz) (04/01/25)   BMI: 25.6 (04/01/25)   NPO Status: 2230   Allergies: PENICILLINS     Pre Vitals  Current as of 04/01/25 1102  BP: 113/72 Pulse: 87   Resp: 18 SpO2: 96   Temp: 36.9 °C (98.5 °F)     Past Medical History   Pituitary tumor Chiari malformation type I   Umbilical hernia Generalized anxiety disorder   GERD (gastroesophageal reflux disease) Esophageal stricture   Fibrocystic disease of both breasts Liver cyst   PCOS (polycystic ovarian syndrome) Endometriosis, unspecified   Fibroid tumor      Surgical History    FRACTURE SURGERY CHOLECYSTECTOMY     Prescription Medications  Within last 14 days from 04/01/25   Last Taken Last Updated   hydrOXYzine pamoate (VISTARIL) 25 MG Cap    Past Month 04/01/25 1013   medroxyPROGESTERone (PROVERA) 5 MG tablet        ondansetron (ZOFRAN-ODT) 8 MG TbDL        traZODone (DESYREL) 50 MG tablet        VYVANSE 50 mg capsule           Latest Reference Range & Units 03/19/25 09:31   WBC 4.50 - 11.50 x10(3)/mcL 5.13   RBC 4.20 - 5.40 x10(6)/mcL 4.54   Hemoglobin 12.0 - 16.0 g/dL 13.4   Hematocrit 37.0 - 47.0 % 40.3   Platelet Count 130 - 400 x10(3)/mcL 364     Pre-op Assessment    I have reviewed the Patient Summary Reports.     I have reviewed the Nursing Notes. I have reviewed the NPO Status.   I have reviewed the Medications.     Review of Systems  Anesthesia Hx:  No problems with previous Anesthesia   History of prior surgery of interest to airway management or planning:  Previous anesthesia: General        Denies Family Hx of Anesthesia complications.    Denies Personal Hx of Anesthesia complications.                    Social:  Former Smoker, No Alcohol Use       Hematology/Oncology:    Oncology Normal    -- Denies Anemia:                      "             EENT/Dental:  EENT/Dental Normal           Cardiovascular:  Exercise tolerance: good    Denies Hypertension.       Denies Angina.                                    Pulmonary:     Denies Asthma.   Denies Shortness of breath.   Denies Sleep Apnea.                Renal/:   Denies Chronic Renal Disease.                Hepatic/GI:     GERD, well controlled Liver Disease,   Not Taking GLP-1 Agonists     Gerd       Liver Disease        Musculoskeletal:  Musculoskeletal Normal                Neurological:    Denies CVA.                                    Endocrine:  Denies Diabetes.         Denies Obesity / BMI > 30  Dermatological:  Skin Normal    Psych:  Psychiatric History                  Physical Exam  General: Well nourished, Cooperative, Alert and Oriented    Airway:  Mallampati: I   Mouth Opening: Normal  TM Distance: Normal  Tongue: Normal  Neck ROM: Normal ROM    Dental:  Intact    Chest/Lungs:  Clear to auscultation, Normal Respiratory Rate    Heart:  Rate: Normal  Rhythm: Regular Rhythm  Sounds: Normal    Abdomen:  Normal, Soft, Nontender        Anesthesia Plan  Type of Anesthesia, risks & benefits discussed:    Anesthesia Type: Gen ETT  Intra-op Monitoring Plan: Standard ASA Monitors  Post Op Pain Control Plan: multimodal analgesia and IV/PO Opioids PRN  Induction:  IV  Airway Plan: Direct, Post-Induction  Informed Consent: Informed consent signed with the Patient and all parties understand the risks and agree with anesthesia plan.  All questions answered. Patient consented to blood products? No  ASA Score: 1  Day of Surgery Review of History & Physical: H&P Update referred to the surgeon/provider.    Ready For Surgery From Anesthesia Perspective.     .

## 2025-04-01 NOTE — DISCHARGE INSTRUCTIONS
Ovarian Cyst Removal Discharge Instructions   About this topic   An ovarian cyst is a fluid-filled sac on or inside an ovary. You have two ovaries. Ovaries are almond-shaped organs in your lower belly. Each month, one of the ovaries produces an egg. The egg is inside of a very small sac called a follicle. Normally, the follicle breaks open and the egg travels down the fallopian tube. If the follicle does not break open, the egg will stay inside of the sac and may form a cyst. Sometimes, the egg is released but blood and other fluids become trapped in the sac to form a cyst. Often the cysts are controlled by hormones. Cysts may vary in size. Some cysts contain small pieces of hair, fat, or other tissues.  Your doctor may remove the cyst with surgery if the cyst is:  Causing pain and bleeding  Too big and is pressing on other organs  Not fluid-filled and feels solid  Suspected of being cancerous  Your doctor may have also had to remove your ovary. You may have had tissue sent to the lab for more tests.       What care is needed at home?   Remove your bandages in 24 hours and leave open to air    You may take a shower in 24 hours   No lifting, pulling, or pushing anything over 10 pounds (4.5 kg)  Ask your doctor when you may go back to your normal activities like work, driving, or sex  Be sure to wash your hands before touching your wound or dressing.  Your bowel movements may take some time to get back to normal. Eat small meals high in fiber. Drink 6 to 8 glasses of water each day to avoid hard stools.  Use a small pillow to put pressure on your belly to make you more comfortable when you cough, laugh, or do other actions.  You can expect some bleeding from your vagina for a few weeks. You may use sanitary pads but not tampons.  No tub baths, swimming, or hot tubs until released by doctor.  Do not peel or pull steri-strips off, steri-strips should fall off in 1 -2 weeks.   No sex until your doctor release  you.    What follow-up care is needed?   Be sure to keep your visits.  You may have stitches or staples. If so, your doctor will often want to remove the stitches or staples in 1 to 2 weeks.  Your doctor will tell you if you need other drugs like hormone therapy. You may need lubricants for sex if your hormones have changed. Talk to your doctor about any changes.  If one ovary was removed and you plan to get pregnant, your doctor may send you to a fertility expert.    Will physical activity be limited?   Rest for the first few days after the procedure. Avoid activities like heavy lifting and hard exercise. Talk to your doctor about the right amount of activity for you.    What problems could happen?   Wound infection  Bleeding  Part of the belly may protrude out (hernia)  Ovarian cysts come back  Blood clots in your legs and lungs    What can be done to prevent this health problem?   If you have recurrent ovarian cysts, birth control pills may help prevent the cysts from coming back. Ask your doctor if birth control pills may help you.    When do I need to call the doctor?   Signs of infection such as a fever of 100.4°F (38°C) or higher, chills, pain with passing urine.  Sudden shortness of breath or a sudden onset of chest pain could be a sign that a blood clot has traveled to your lungs. Go to the ER right away.  Signs of wound infection such as swelling, redness, warmth around the wound; too much pain when touched; yellowish, greenish, or bloody discharge; foul smell coming from the cut site; cut site opens up.  Upset stomach and throwing up

## 2025-04-02 PROCEDURE — 25000003 PHARM REV CODE 250: Performed by: OBSTETRICS & GYNECOLOGY

## 2025-04-02 PROCEDURE — 63600175 PHARM REV CODE 636 W HCPCS: Mod: JZ,TB | Performed by: OBSTETRICS & GYNECOLOGY

## 2025-04-02 RX ORDER — PROMETHAZINE HYDROCHLORIDE 12.5 MG/1
12.5 TABLET ORAL EVERY 6 HOURS PRN
Qty: 28 TABLET | Refills: 0 | Status: SHIPPED | OUTPATIENT
Start: 2025-04-02 | End: 2025-04-09

## 2025-04-02 RX ADMIN — OXYCODONE HYDROCHLORIDE AND ACETAMINOPHEN 1 TABLET: 5; 325 TABLET ORAL at 12:04

## 2025-04-02 RX ADMIN — OXYCODONE HYDROCHLORIDE AND ACETAMINOPHEN 1 TABLET: 5; 325 TABLET ORAL at 01:04

## 2025-04-02 RX ADMIN — SODIUM CHLORIDE, POTASSIUM CHLORIDE, SODIUM LACTATE AND CALCIUM CHLORIDE: 600; 310; 30; 20 INJECTION, SOLUTION INTRAVENOUS at 09:04

## 2025-04-02 RX ADMIN — OXYCODONE HYDROCHLORIDE AND ACETAMINOPHEN 1 TABLET: 5; 325 TABLET ORAL at 08:04

## 2025-04-02 RX ADMIN — SODIUM CHLORIDE, POTASSIUM CHLORIDE, SODIUM LACTATE AND CALCIUM CHLORIDE: 600; 310; 30; 20 INJECTION, SOLUTION INTRAVENOUS at 01:04

## 2025-04-02 RX ADMIN — KETOROLAC TROMETHAMINE 30 MG: 30 INJECTION, SOLUTION INTRAMUSCULAR; INTRAVENOUS at 05:04

## 2025-04-02 NOTE — PROGRESS NOTES
Dr Whitaker notified of pts recent b/p readings in the 90's/50/60,s, urinary output sluggish, loc drowsey, scopalamine patch removal. New orders noted.

## 2025-04-02 NOTE — DISCHARGE SUMMARY
[facility] - Med Surg  Discharge Summary  OBGYN    Admission date: 4/1/2025  Discharge date: 04/02/2025    Admit Dx: Pelvic pain, AUB, fibroids     Discharge Dx:  Endometriosis, pelvic adhesions, fibroids    Attending Physician: Rosamaria Whitaker MD   Discharge Provider: ADRIENNE MIKE    Procedures Performed: Procedure(s) (LRB):  EXCISION, CYST, OVARY / Left open (Left)  LYSIS, ADHESIONS, OPEN    Hospital Course: Patient was admitted on 4/1/2025 for Open cystectomy and myomectomy, but myomectomy unable to be completed due to adherence of the uterus to the rectum.  Hospital course was uncomplicated.  On the date of discharge, Ms. Cervantes was able to tolerate things orally, ambulating without difficulty, and her pain was well controlled. At that point she was afebrile, and her labs were stable. She was discharged to home in stable condition. She requests a prescription of promethazine to have for nausea as needed. Zofran historically does not work well for her.    Consults: none    Significant Diagnostic Studies:   Repeat H&H was stable    Discharged Condition: stable, post op pain meds in hand.    Disposition: Home    Follow Up:    Follow-up Information       Rosamaria Whitaker MD. Go in 2 week(s).    Specialty: Obstetrics and Gynecology  Why: For wound re-check  Contact information:  [address] [place] [address]  [address]  [facility] [id number] [phone]                             Medications:  Current Discharge Medication List        START taking these medications    Details   promethazine (PHENERGAN) 12.5 MG Tab Take 1 tablet (12.5 mg total) by mouth every 6 (six) hours as needed (for nausea).  Qty: 28 tablet, Refills: 0           CONTINUE these medications which have NOT CHANGED    Details   hydrOXYzine pamoate (VISTARIL) 25 MG Cap Take 25 mg by mouth as needed.      ondansetron (ZOFRAN-ODT) 8 MG TbDL Take 1 tablet (8 mg total) by mouth every 8 (eight) hours as needed (for nausea and  vomitting).  Qty: 21 tablet, Refills: 0    Associated Diagnoses: Nausea      VYVANSE 50 mg capsule Take 50 mg by mouth every morning.      medroxyPROGESTERone (PROVERA) 5 MG tablet Take 5 mg by mouth once daily. For 5 days. Started 3/24 and will complete 3/28.      traZODone (DESYREL) 50 MG tablet Take 1 tablet (50 mg total) by mouth every evening.  Qty: 90 tablet, Refills: 1    Associated Diagnoses: Insomnia, unspecified type             Discharge Procedure Orders   Ice to affected area     Lifting restrictions     No driving until:     Pelvic Rest     Notify your health care provider if you experience any of the following:  temperature >100.4     Notify your health care provider if you experience any of the following:  severe uncontrolled pain     Notify your health care provider if you experience any of the following:  redness, tenderness, or signs of infection (pain, swelling, redness, odor or green/yellow discharge around incision site)     Activity as tolerated     Weight bearing restrictions (specify):

## 2025-04-02 NOTE — PLAN OF CARE
Vitals signs stable. Pain and nausea well controlled, passing flatus. Discharge criteria met.  Discharged via wheelchair to private auto accompanied by spouse & family.      Problem: Comorbidity Management  Goal: Maintenance of Behavioral Health Symptom Control  4/2/2025 1306 by Shawnee Prado RN  Outcome: Met  4/2/2025 0626 by Shawnee Prado RN  Outcome: Progressing     Problem: Fall Injury Risk  Goal: Absence of Fall and Fall-Related Injury  4/2/2025 1306 by Shawnee Prado RN  Outcome: Met  4/2/2025 0626 by Shawnee Prado RN  Outcome: Progressing     Problem: Infection  Goal: Absence of Infection Signs and Symptoms  4/2/2025 1306 by Shawnee Prado RN  Outcome: Met  4/2/2025 0626 by Shawnee Prado RN  Outcome: Progressing     Problem: Pain Acute  Goal: Optimal Pain Control and Function  4/2/2025 1306 by Shawnee Prado RN  Outcome: Met  4/2/2025 0626 by Shawnee Prado RN  Outcome: Progressing     Problem: Adult Inpatient Plan of Care  Goal: Plan of Care Review  4/2/2025 1306 by Shawnee Prado RN  Outcome: Met  4/2/2025 0626 by Shawnee Prado RN  Outcome: Progressing  Goal: Patient-Specific Goal (Individualized)  4/2/2025 1306 by Shawnee Prado RN  Outcome: Met  4/2/2025 0626 by Shawnee Prado RN  Outcome: Progressing  Goal: Absence of Hospital-Acquired Illness or Injury  4/2/2025 1306 by Shawnee Prado RN  Outcome: Met  4/2/2025 0626 by Shawnee Prado RN  Outcome: Progressing  Goal: Optimal Comfort and Wellbeing  4/2/2025 1306 by Shawnee Prado RN  Outcome: Met  4/2/2025 0626 by Shawnee Prado RN  Outcome: Progressing  Goal: Readiness for Transition of Care  4/2/2025 1306 by Shawnee Prado RN  Outcome: Met  4/2/2025 0626 by Shawnee Prado, RN  Outcome: Progressing     Problem: Wound  Goal: Optimal Coping  4/2/2025 1306 by Shawnee Prado, RN  Outcome: Met  4/2/2025 0626 by Shawnee Prado, RN  Outcome: Progressing  Goal: Optimal Functional  Ability  4/2/2025 1306 by Shawnee Prado RN  Outcome: Met  4/2/2025 0626 by Shawnee Prado RN  Outcome: Progressing  Goal: Absence of Infection Signs and Symptoms  4/2/2025 1306 by Shawnee Prado RN  Outcome: Met  4/2/2025 0626 by Shawnee Prado RN  Outcome: Progressing  Goal: Improved Oral Intake  4/2/2025 1306 by Shawnee Prado RN  Outcome: Met  4/2/2025 0626 by Shawnee Prado RN  Outcome: Progressing  Goal: Optimal Pain Control and Function  4/2/2025 1306 by Shawnee Prado RN  Outcome: Met  4/2/2025 0626 by Shawnee Prado RN  Outcome: Progressing  Goal: Skin Health and Integrity  4/2/2025 1306 by Shawnee Prado RN  Outcome: Met  4/2/2025 0626 by Shawnee Prado RN  Outcome: Progressing  Goal: Optimal Wound Healing  4/2/2025 1306 by Shawnee Prado RN  Outcome: Met  4/2/2025 0626 by Shawnee Prado RN  Outcome: Progressing

## 2025-04-02 NOTE — PLAN OF CARE
Pt received in Room 1 awake, alert, lying in bed.  Abd dressing dry & intact.  IV site benign with LR at 125 ml/hr via pump.  Wife at bedside.  Assisted to commode, voided w/o difficulty, cleaned self & own clothes placed on.  Ambulated 1 lap down pelayo w/o difficulty, returned to bed, breakfast served.   Problem: Comorbidity Management  Goal: Maintenance of Behavioral Health Symptom Control  Outcome: Progressing     Problem: Fall Injury Risk  Goal: Absence of Fall and Fall-Related Injury  Outcome: Progressing     Problem: Infection  Goal: Absence of Infection Signs and Symptoms  Outcome: Progressing     Problem: Pain Acute  Goal: Optimal Pain Control and Function  Outcome: Progressing     Problem: Adult Inpatient Plan of Care  Goal: Plan of Care Review  Outcome: Progressing  Goal: Patient-Specific Goal (Individualized)  Outcome: Progressing  Goal: Absence of Hospital-Acquired Illness or Injury  Outcome: Progressing  Goal: Optimal Comfort and Wellbeing  Outcome: Progressing  Goal: Readiness for Transition of Care  Outcome: Progressing     Problem: Wound  Goal: Optimal Coping  Outcome: Progressing  Goal: Optimal Functional Ability  Outcome: Progressing  Goal: Absence of Infection Signs and Symptoms  Outcome: Progressing  Goal: Improved Oral Intake  Outcome: Progressing  Goal: Optimal Pain Control and Function  Outcome: Progressing  Goal: Skin Health and Integrity  Outcome: Progressing  Goal: Optimal Wound Healing  Outcome: Progressing

## 2025-04-03 VITALS
OXYGEN SATURATION: 99 % | BODY MASS INDEX: 25.71 KG/M2 | DIASTOLIC BLOOD PRESSURE: 71 MMHG | HEART RATE: 89 BPM | TEMPERATURE: 98 F | RESPIRATION RATE: 18 BRPM | WEIGHT: 163.81 LBS | HEIGHT: 67 IN | SYSTOLIC BLOOD PRESSURE: 108 MMHG

## 2025-04-04 LAB — PSYCHE PATHOLOGY RESULT: NORMAL

## 2025-04-08 ENCOUNTER — PATIENT OUTREACH (OUTPATIENT)
Facility: OTHER | Age: 30
End: 2025-04-08
Payer: COMMERCIAL

## 2025-04-08 ENCOUNTER — NURSE TRIAGE (OUTPATIENT)
Dept: ADMINISTRATIVE | Facility: CLINIC | Age: 30
End: 2025-04-08
Payer: COMMERCIAL

## 2025-04-08 ENCOUNTER — OCHSNER VIRTUAL EMERGENCY DEPARTMENT (OUTPATIENT)
Facility: CLINIC | Age: 30
End: 2025-04-08
Payer: COMMERCIAL

## 2025-04-08 DIAGNOSIS — R21 RASH: Primary | ICD-10-CM

## 2025-04-09 NOTE — TELEPHONE ENCOUNTER
Pt calling in, had surgery a week ago. Returned to work today. Had a lot of itching today. Got home and has a rash on incision and down hips and thighs. Incision to lower pelvic area. No rash to anywhere else on abdomen. Denies fever or sign of infection. No drainage from incision. Started back on pantoprazole and stool softener today after surgery. Has taken these medications in the past. Does have a headache. Has not taken anything for the headache. Has tried vera for the rash which provided mild relief. Rash is warm to touch. Dispo is see HCP within 4 hours or PCP triage. Not sure if related to surgery or not since it just showed up today. No PCP on call for [facility]. Sent to [facility]. [facility] advised to contact on call surgeon. Spoke to Dr. Perez who advised for pt to come to obstetrical triage. Pt verbalized understanding. Advised to call back with further concerns.    Reason for Disposition   [1] Headache AND [2] no fever    Additional Information   Negative: [1] Life-threatening reaction (anaphylaxis) in the past to similar substance (e.g., food, insect bite/sting, chemical, etc.) AND [2] < 2 hours since exposure   Negative: [1] Sudden onset of rash (within last 2 hours) AND [2] difficulty breathing or swallowing   Negative: Shock suspected (e.g., cold/pale/clammy skin, too weak to stand, low BP, rapid pulse)   Negative: Difficult to awaken or acting confused (e.g., disoriented, slurred speech)   Negative: [1] Purple or blood-colored spots or dots AND [2] fever   Negative: Sounds like a life-threatening emergency to the triager   Negative: [1] Bright red, sunburn-like rash AND [2] current tampon use or nasal packing   Negative: [1] Bright red, sunburn-like rash AND [3] wound infection or recent surgery   Negative: [1] Bright red skin AND [2] peels off in sheets   Negative: Stiff neck (can't touch chin to chest)   Negative: Fever   Negative: Joint pain or swelling   Negative: Patient sounds very sick or weak to the  triager   Negative: [1] Purple or blood-colored rash (spots or dots) AND [2] no fever AND [3] sounds well to triager   Negative: Large or small blisters on skin (i.e., fluid filled bubbles or sacs)   Negative: Bloody crusts on lips or sores in mouth   Negative: Face becomes swollen    Protocols used: Rash or Redness - Widespread-A-AH

## 2025-04-09 NOTE — PROGRESS NOTES
"Patient spoke with [facility]OC RN on 4/8/2025 with complaint of "t calling in, had surgery a week ago. Returned to work today. Had a lot of itching today. Got home and has a rash on incision and down hips and thighs. Incision to lower pelvic area. No rash to anywhere else on abdomen. Denies fever or sign of infection. No drainage from incision. Started back on pantoprazole and stool softener today after surgery. Has taken these medications in the past. Does have a headache. Has not taken anything for the headache. Has tried vera for the rash which provided mild relief. Rash is warm to touch."    OOC RN consulted with [facility] provider, Dr. Alas, and disposition recommended was specialty/obstetrics and gynecology.  OOC RN sent message to OBGYN surgeon for further advisement.  Will follow up with patient during the week of 4/9/2025 to 4/13/2025 to assess for any additional concerns or needs.     "

## 2025-04-09 NOTE — PLAN OF CARE-OVED
[facility] Emergency Department Plan of Care Note  Referral Source: Nurse On-Call                               Chief Complaint   Patient presents with    Rash     MDM; pt w/ surg 1 wk ago, now w/ itch rash started at incision site on lower abd and extending down to legs.  Rec discussion w/ obgyn on call.    Recommendation: Specialist Referral         Specialty: Obstetrics / Gynecology                  Encounter Diagnosis   Name Primary?    Rash Yes

## 2025-04-10 ENCOUNTER — PATIENT OUTREACH (OUTPATIENT)
Facility: OTHER | Age: 30
End: 2025-04-10
Payer: COMMERCIAL

## 2025-04-10 NOTE — PROGRESS NOTES
Spoke with patient to assess if she received the care she was needing after speaking with [facility] Rn and [facility] provider consulted on 4/8/2025.  Patient stated she followed up with her OBGYN and issue was managed.  Patient appreciative for follow up.  No further concerns noted.

## 2025-06-09 ENCOUNTER — LAB VISIT (OUTPATIENT)
Dept: LAB | Facility: HOSPITAL | Age: 30
End: 2025-06-09
Attending: STUDENT IN AN ORGANIZED HEALTH CARE EDUCATION/TRAINING PROGRAM
Payer: COMMERCIAL

## 2025-06-09 DIAGNOSIS — N80.9 ENDOMETRIOSIS: ICD-10-CM

## 2025-06-09 DIAGNOSIS — F41.1 GENERALIZED ANXIETY DISORDER: ICD-10-CM

## 2025-06-09 DIAGNOSIS — N80.129 ENDOMETRIOMA: ICD-10-CM

## 2025-06-09 DIAGNOSIS — R11.0 NAUSEA: ICD-10-CM

## 2025-06-09 DIAGNOSIS — K21.9 GASTROESOPHAGEAL REFLUX DISEASE, UNSPECIFIED WHETHER ESOPHAGITIS PRESENT: Primary | ICD-10-CM

## 2025-06-09 DIAGNOSIS — D25.9 UTERINE LEIOMYOMA, UNSPECIFIED LOCATION: ICD-10-CM

## 2025-06-09 LAB
ALBUMIN SERPL-MCNC: 3.8 G/DL (ref 3.5–5)
ALBUMIN/GLOB SERPL: 1.1 RATIO (ref 1.1–2)
ALP SERPL-CCNC: 68 UNIT/L (ref 40–150)
ALT SERPL-CCNC: 27 UNIT/L (ref 0–55)
ANION GAP SERPL CALC-SCNC: 7 MEQ/L
AST SERPL-CCNC: 21 UNIT/L (ref 11–45)
BASOPHILS # BLD AUTO: 0.05 X10(3)/MCL
BASOPHILS NFR BLD AUTO: 0.7 %
BILIRUB SERPL-MCNC: 1.9 MG/DL
BUN SERPL-MCNC: 12.3 MG/DL (ref 7–18.7)
CALCIUM SERPL-MCNC: 8.9 MG/DL (ref 8.4–10.2)
CHLORIDE SERPL-SCNC: 107 MMOL/L (ref 98–107)
CHOLEST SERPL-MCNC: 148 MG/DL
CHOLEST/HDLC SERPL: 3 {RATIO} (ref 0–5)
CO2 SERPL-SCNC: 23 MMOL/L (ref 22–29)
CORTIS SERPL-SCNC: 4.6 UG/DL
CREAT SERPL-MCNC: 0.73 MG/DL (ref 0.55–1.02)
CREAT/UREA NIT SERPL: 17
EOSINOPHIL # BLD AUTO: 0.11 X10(3)/MCL (ref 0–0.9)
EOSINOPHIL NFR BLD AUTO: 1.5 %
ERYTHROCYTE [DISTWIDTH] IN BLOOD BY AUTOMATED COUNT: 12.5 % (ref 11.5–17)
EST. AVERAGE GLUCOSE BLD GHB EST-MCNC: 96.8 MG/DL
GFR SERPLBLD CREATININE-BSD FMLA CKD-EPI: >60 ML/MIN/1.73/M2
GLOBULIN SER-MCNC: 3.5 GM/DL (ref 2.4–3.5)
GLUCOSE SERPL-MCNC: 90 MG/DL (ref 74–100)
HBA1C MFR BLD: 5 %
HCT VFR BLD AUTO: 38.1 % (ref 37–47)
HDLC SERPL-MCNC: 50 MG/DL (ref 35–60)
HGB BLD-MCNC: 12.7 G/DL (ref 12–16)
IMM GRANULOCYTES # BLD AUTO: 0.02 X10(3)/MCL (ref 0–0.04)
IMM GRANULOCYTES NFR BLD AUTO: 0.3 %
LDLC SERPL CALC-MCNC: 84 MG/DL (ref 50–140)
LYMPHOCYTES # BLD AUTO: 1.96 X10(3)/MCL (ref 0.6–4.6)
LYMPHOCYTES NFR BLD AUTO: 26.5 %
MCH RBC QN AUTO: 30 PG (ref 27–31)
MCHC RBC AUTO-ENTMCNC: 33.3 G/DL (ref 33–36)
MCV RBC AUTO: 90.1 FL (ref 80–94)
MONOCYTES # BLD AUTO: 0.7 X10(3)/MCL (ref 0.1–1.3)
MONOCYTES NFR BLD AUTO: 9.4 %
NEUTROPHILS # BLD AUTO: 4.57 X10(3)/MCL (ref 2.1–9.2)
NEUTROPHILS NFR BLD AUTO: 61.6 %
NRBC BLD AUTO-RTO: 0 %
PLATELET # BLD AUTO: 381 X10(3)/MCL (ref 130–400)
PMV BLD AUTO: 10.1 FL (ref 7.4–10.4)
POTASSIUM SERPL-SCNC: 3.8 MMOL/L (ref 3.5–5.1)
PROT SERPL-MCNC: 7.3 GM/DL (ref 6.4–8.3)
RBC # BLD AUTO: 4.23 X10(6)/MCL (ref 4.2–5.4)
SODIUM SERPL-SCNC: 137 MMOL/L (ref 136–145)
T4 FREE SERPL-MCNC: 0.99 NG/DL (ref 0.7–1.48)
TRIGL SERPL-MCNC: 70 MG/DL (ref 37–140)
TSH SERPL-ACNC: 1.68 UIU/ML (ref 0.35–4.94)
VIT B12 SERPL-MCNC: 290 PG/ML (ref 213–816)
VLDLC SERPL CALC-MCNC: 14 MG/DL
WBC # BLD AUTO: 7.41 X10(3)/MCL (ref 4.5–11.5)

## 2025-06-09 PROCEDURE — 36415 COLL VENOUS BLD VENIPUNCTURE: CPT

## 2025-06-09 PROCEDURE — 84443 ASSAY THYROID STIM HORMONE: CPT

## 2025-06-09 PROCEDURE — 85025 COMPLETE CBC W/AUTO DIFF WBC: CPT

## 2025-06-09 PROCEDURE — 84439 ASSAY OF FREE THYROXINE: CPT

## 2025-06-09 PROCEDURE — 80053 COMPREHEN METABOLIC PANEL: CPT

## 2025-06-09 PROCEDURE — 82607 VITAMIN B-12: CPT

## 2025-06-09 PROCEDURE — 83036 HEMOGLOBIN GLYCOSYLATED A1C: CPT

## 2025-06-09 PROCEDURE — 80061 LIPID PANEL: CPT

## 2025-06-09 PROCEDURE — 82533 TOTAL CORTISOL: CPT

## 2025-06-18 DIAGNOSIS — D35.2 PITUITARY MICROADENOMA: ICD-10-CM

## 2025-06-18 DIAGNOSIS — G93.5 CHIARI MALFORMATION TYPE I: Primary | ICD-10-CM

## 2025-06-25 ENCOUNTER — OFFICE VISIT (OUTPATIENT)
Dept: NEUROLOGY | Facility: CLINIC | Age: 30
End: 2025-06-25
Payer: COMMERCIAL

## 2025-06-25 VITALS
HEIGHT: 67 IN | SYSTOLIC BLOOD PRESSURE: 122 MMHG | DIASTOLIC BLOOD PRESSURE: 72 MMHG | WEIGHT: 163 LBS | BODY MASS INDEX: 25.58 KG/M2

## 2025-06-25 DIAGNOSIS — G93.5 CHIARI MALFORMATION TYPE I: Primary | ICD-10-CM

## 2025-06-25 PROCEDURE — 3078F DIAST BP <80 MM HG: CPT | Mod: CPTII,S$GLB,, | Performed by: PSYCHIATRY & NEUROLOGY

## 2025-06-25 PROCEDURE — 1159F MED LIST DOCD IN RCRD: CPT | Mod: CPTII,S$GLB,, | Performed by: PSYCHIATRY & NEUROLOGY

## 2025-06-25 PROCEDURE — 99999 PR PBB SHADOW E&M-EST. PATIENT-LVL III: CPT | Mod: PBBFAC,,, | Performed by: PSYCHIATRY & NEUROLOGY

## 2025-06-25 PROCEDURE — 3074F SYST BP LT 130 MM HG: CPT | Mod: CPTII,S$GLB,, | Performed by: PSYCHIATRY & NEUROLOGY

## 2025-06-25 PROCEDURE — 3008F BODY MASS INDEX DOCD: CPT | Mod: CPTII,S$GLB,, | Performed by: PSYCHIATRY & NEUROLOGY

## 2025-06-25 PROCEDURE — 3044F HG A1C LEVEL LT 7.0%: CPT | Mod: CPTII,S$GLB,, | Performed by: PSYCHIATRY & NEUROLOGY

## 2025-06-25 PROCEDURE — 99205 OFFICE O/P NEW HI 60 MIN: CPT | Mod: S$GLB,,, | Performed by: PSYCHIATRY & NEUROLOGY

## 2025-06-25 RX ORDER — TOPIRAMATE 50 MG/1
50 TABLET, FILM COATED ORAL NIGHTLY
Qty: 30 TABLET | Refills: 11 | Status: SHIPPED | OUTPATIENT
Start: 2025-06-25 | End: 2026-06-25

## 2025-06-25 NOTE — PROGRESS NOTES
Subjective     Patient ID: Stephania Cervantes is a 29 y.o. female.    Chief Complaint: chairi malformation (New pt - referred by Dr Verenice Fitzgerald) and pituitary microadenoma     HPI  Imaging/clearance referral    Chiari 1: imaged last 2025; stable; symptomatic (hand/feet numbness; mild low grade headaches)  No h/o kidney stones  Imaging reviewed    Pituitary tumor: decreased in size from prior    Slated for hysterectomy/endometrioma resection; needs clearance for trendelenburg    Per neurosurgery literature, trendelenburg may actually help chiari symptoms  Denies any vision c/o  Review of Systems  The remainder of the 14 system ROS is noncontributory or negative unless mentioned/reviewed above.         Objective     Physical Exam  Mental Status: Alert and oriented x3. Language is fluent with good comprehension.    Cranial Nerve: Pupils are equal, round, and reactive to light. Visual fields are intact to confrontation. Normal fundi. Ocular movements are intact. Face is symmetric at rest and with activation with intact sensation throughout. Hearing intact to finger rub bilaterally. Muscles of tongue and palate activate symmetrically. No dysarthria. Strength is full in sternocleidomastoid and trapezius bilaterally.    Motor: Muscle bulk and tone are normal. Strength is 5/5 in all four extremities both proximally and distally. Intact fine motor movements bilaterally. There is no pronator drift or satelliting on arm roll.    Sensory: Sensation is intact to light touch, pinprick, vibration, and proprioception throughout. Romberg is negative.    Reflexes: 2+ and symmetric at the biceps, triceps, brachioradialis, patella, and Achilles bilaterally. Plantar response is flexor bilaterally.    Coordination: No dysmetria on finger-nose-finger or heel-knee-shin. Normal rapid alternating movements. Fast finger tapping with normal amplitude and speed.    Gait: Narrow based with normal stride length and good arm swing  bilaterally. Able to walk on heels, toes, and in tandem.       Assessment and Plan     1. Chiari malformation type I    Other orders  -     topiramate (TOPAMAX) 50 MG tablet; Take 1 tablet (50 mg total) by mouth every evening.  Dispense: 30 tablet; Refill: 11        Trial of topamax  Cleared for trendelenburg position for hysterectomy         Follow up in about 3 months (around 9/25/2025).

## 2025-07-09 ENCOUNTER — OFFICE VISIT (OUTPATIENT)
Dept: URGENT CARE | Facility: CLINIC | Age: 30
End: 2025-07-09
Payer: COMMERCIAL

## 2025-07-09 VITALS
OXYGEN SATURATION: 100 % | TEMPERATURE: 99 F | HEART RATE: 82 BPM | HEIGHT: 67 IN | DIASTOLIC BLOOD PRESSURE: 71 MMHG | WEIGHT: 183 LBS | BODY MASS INDEX: 28.72 KG/M2 | RESPIRATION RATE: 18 BRPM | SYSTOLIC BLOOD PRESSURE: 105 MMHG

## 2025-07-09 DIAGNOSIS — R11.0 NAUSEA: ICD-10-CM

## 2025-07-09 DIAGNOSIS — J02.9 SORE THROAT: ICD-10-CM

## 2025-07-09 DIAGNOSIS — J02.0 STREP PHARYNGITIS: Primary | ICD-10-CM

## 2025-07-09 LAB
B-HCG UR QL: NEGATIVE
CTP QC/QA: YES
CTP QC/QA: YES
MOLECULAR STREP A: POSITIVE

## 2025-07-09 PROCEDURE — 63600175 PHARM REV CODE 636 W HCPCS: Mod: JZ,TB

## 2025-07-09 PROCEDURE — 99215 OFFICE O/P EST HI 40 MIN: CPT | Mod: PBBFAC | Performed by: NURSE PRACTITIONER

## 2025-07-09 PROCEDURE — 81025 URINE PREGNANCY TEST: CPT | Mod: PBBFAC | Performed by: NURSE PRACTITIONER

## 2025-07-09 PROCEDURE — 87651 STREP A DNA AMP PROBE: CPT | Mod: PBBFAC | Performed by: NURSE PRACTITIONER

## 2025-07-09 RX ORDER — KETOROLAC TROMETHAMINE 30 MG/ML
60 INJECTION, SOLUTION INTRAMUSCULAR; INTRAVENOUS
Status: COMPLETED | OUTPATIENT
Start: 2025-07-09 | End: 2025-07-09

## 2025-07-09 RX ORDER — PHENOL/SODIUM PHENOLATE
AEROSOL, SPRAY (ML) MUCOUS MEMBRANE
COMMUNITY
Start: 2025-02-11

## 2025-07-09 RX ORDER — ONDANSETRON 4 MG/1
4 TABLET, ORALLY DISINTEGRATING ORAL EVERY 6 HOURS PRN
Qty: 16 TABLET | Refills: 0 | Status: SHIPPED | OUTPATIENT
Start: 2025-07-09 | End: 2025-07-13

## 2025-07-09 RX ORDER — HYDROCODONE BITARTRATE AND ACETAMINOPHEN 5; 325 MG/1; MG/1
1 TABLET ORAL EVERY 4 HOURS PRN
COMMUNITY
Start: 2025-06-30

## 2025-07-09 RX ORDER — CLINDAMYCIN HYDROCHLORIDE 300 MG/1
300 CAPSULE ORAL EVERY 8 HOURS
Qty: 30 CAPSULE | Refills: 0 | Status: SHIPPED | OUTPATIENT
Start: 2025-07-09 | End: 2025-07-19

## 2025-07-09 RX ADMIN — KETOROLAC TROMETHAMINE 60 MG: 60 INJECTION, SOLUTION INTRAMUSCULAR at 08:07

## 2025-07-10 NOTE — PROGRESS NOTES
"Subjective:      Patient ID: Stephania Cervantes is a 29 y.o. female.    Vitals:  height is 5' 7" (1.702 m) and weight is 83 kg (183 lb). Her temperature is 98.8 °F (37.1 °C). Her blood pressure is 105/71 and her pulse is 82. Her respiration is 18 and oxygen saturation is 100%.     Chief Complaint: Sore Throat (Pt c/o sore throat, cough, nausea , body aches x Sunday )    Sore Throat      as stated in chief complaint.  Patient reports taking over-the-counter Advil for pain.  Patient reports bilateral neck gland tenderness in which Advil provided minimal relief.  Patient denies fever, shortness for breath or chest pain, headache, dizziness, weakness, stomach pain, vomiting dysuria    HENT:  Positive for sore throat.       Objective:     Physical Exam   Constitutional: She is oriented to person, place, and time. She appears well-developed. She is cooperative.  Non-toxic appearance. She does not appear ill. No distress. awake  HENT:   Head: Normocephalic and atraumatic.   Ears:   Right Ear: Tympanic membrane normal.   Left Ear: Tympanic membrane normal.   Nose: Nose normal. No rhinorrhea, purulent discharge or congestion. Right sinus exhibits no maxillary sinus tenderness and no frontal sinus tenderness. Left sinus exhibits no maxillary sinus tenderness and no frontal sinus tenderness.   Mouth/Throat: Uvula is midline. Mucous membranes are moist. Oropharyngeal exudate and posterior oropharyngeal erythema present. Tonsils are 0 on the right. Tonsils are 0 on the left. Tonsillar exudate.       Eyes: Conjunctivae are normal. Right eye exhibits no discharge. Left eye exhibits no discharge. Extraocular movement intact   Neck: Neck supple. No neck rigidity present.   Cardiovascular: Normal rate, regular rhythm and normal pulses.   Pulmonary/Chest: Effort normal and breath sounds normal. No stridor. No respiratory distress. She has no wheezes. She has no rhonchi. She has no rales. She exhibits no tenderness.   Abdominal: " Normal appearance and bowel sounds are normal. Soft. There is no left CVA tenderness and no right CVA tenderness.   Musculoskeletal: Normal range of motion.         General: Normal range of motion.      Cervical back: She exhibits no tenderness.   Lymphadenopathy:     She has no cervical adenopathy.   Neurological: She is alert and oriented to person, place, and time.   Skin: Skin is warm, dry and not diaphoretic. Capillary refill takes less than 2 seconds.   Psychiatric: Her behavior is normal. Mood, judgment and thought content normal.   Nursing note and vitals reviewed.chaperone present (girlfriend and toddler boy)         Assessment:     1. Strep pharyngitis    2. Sore throat    3. Nausea      Results for orders placed or performed in visit on 07/09/25   POCT Strep A, Molecular    Collection Time: 07/09/25  7:55 PM   Result Value Ref Range    Molecular Strep A, POC Positive (A) Negative     Acceptable Yes    POCT urine pregnancy    Collection Time: 07/09/25  8:06 PM   Result Value Ref Range    POC Preg Test, Ur Negative Negative     Acceptable Yes          Plan:   ER precautions given and discussed  Avoided penicillin due to allergy prescribed clean to 3 times a day for 10 days   Start antibiotics today.  -drink plenty of fluids dehydrated  -Zofran 20-30 minutes prior to eating or drinking taking medication  -Tylenol or Motrin for pain and fever if not contraindicated  -warm saltwater gargles to your throat  - Easy to swallow foods such as applesauce, smoothies, protein shakes, grits, oatmeal.  - Alternate OTC pain/fever reducers every 4 hours today and tomorrow.  - Out of school and/or work until you have taken 24 hours of antibiotics and are fever free for 24 hours.  - New tooth brush on Day of Week: Saturday.  Strep pharyngitis  -     clindamycin (CLEOCIN) 300 MG capsule; Take 1 capsule (300 mg total) by mouth every 8 (eight) hours. for 10 days  Dispense: 30 capsule; Refill: 0  -      ondansetron (ZOFRAN-ODT) 4 MG TbDL; Take 1 tablet (4 mg total) by mouth every 6 (six) hours as needed (nausea and vomiting).  Dispense: 16 tablet; Refill: 0    Sore throat  -     POCT Strep A, Molecular  -     POCT urine pregnancy  -     ketorolac injection 60 mg    Nausea  -     ondansetron (ZOFRAN-ODT) 4 MG TbDL; Take 1 tablet (4 mg total) by mouth every 6 (six) hours as needed (nausea and vomiting).  Dispense: 16 tablet; Refill: 0

## 2025-07-10 NOTE — PATIENT INSTRUCTIONS
Please follow instructions on patient education material.      Return to urgent care in 2 to 3 days if symptoms are not improving, immediately if you develop any new or worsening symptoms.     - Start antibiotics today.  -drink plenty of fluids dehydrated  -Zofran 20-30 minutes prior to eating or drinking taking medication  -Tylenol or Motrin for pain and fever if not contraindicated  -warm saltwater gargles to your throat  - Easy to swallow foods such as applesauce, smoothies, protein shakes, grits, oatmeal.  - Alternate OTC pain/fever reducers every 4 hours today and tomorrow.  - Out of school and/or work until you have taken 24 hours of antibiotics and are fever free for 24 hours.  - New tooth brush on Day of Week: Saturday.  - Strep IS CONTAGIOUS and is spread by spit. Do not share food, drinks, utensils, cosmetics, or saliva in any way until you are done with antibiotics.

## 2025-08-20 ENCOUNTER — LAB VISIT (OUTPATIENT)
Dept: LAB | Facility: HOSPITAL | Age: 30
End: 2025-08-20
Attending: OBSTETRICS & GYNECOLOGY
Payer: COMMERCIAL

## 2025-08-20 DIAGNOSIS — N80.9 ENDOMETRIOSIS: Primary | ICD-10-CM

## 2025-08-20 LAB
BASOPHILS # BLD AUTO: 0.05 X10(3)/MCL
BASOPHILS NFR BLD AUTO: 0.6 %
EOSINOPHIL # BLD AUTO: 0.17 X10(3)/MCL (ref 0–0.9)
EOSINOPHIL NFR BLD AUTO: 1.9 %
ERYTHROCYTE [DISTWIDTH] IN BLOOD BY AUTOMATED COUNT: 12.6 % (ref 11.5–17)
HCT VFR BLD AUTO: 37.8 % (ref 37–47)
HGB BLD-MCNC: 12.6 G/DL (ref 12–16)
IMM GRANULOCYTES # BLD AUTO: 0.04 X10(3)/MCL (ref 0–0.04)
IMM GRANULOCYTES NFR BLD AUTO: 0.5 %
LYMPHOCYTES # BLD AUTO: 2.61 X10(3)/MCL (ref 0.6–4.6)
LYMPHOCYTES NFR BLD AUTO: 29.5 %
MCH RBC QN AUTO: 30.1 PG (ref 27–31)
MCHC RBC AUTO-ENTMCNC: 33.3 G/DL (ref 33–36)
MCV RBC AUTO: 90.4 FL (ref 80–94)
MONOCYTES # BLD AUTO: 0.66 X10(3)/MCL (ref 0.1–1.3)
MONOCYTES NFR BLD AUTO: 7.5 %
NEUTROPHILS # BLD AUTO: 5.32 X10(3)/MCL (ref 2.1–9.2)
NEUTROPHILS NFR BLD AUTO: 60 %
NRBC BLD AUTO-RTO: 0 %
PLATELET # BLD AUTO: 386 X10(3)/MCL (ref 130–400)
PMV BLD AUTO: 10.1 FL (ref 7.4–10.4)
RBC # BLD AUTO: 4.18 X10(6)/MCL (ref 4.2–5.4)
WBC # BLD AUTO: 8.85 X10(3)/MCL (ref 4.5–11.5)

## 2025-08-20 PROCEDURE — 85025 COMPLETE CBC W/AUTO DIFF WBC: CPT

## 2025-08-20 PROCEDURE — 36415 COLL VENOUS BLD VENIPUNCTURE: CPT

## (undated) DEVICE — SOL NACL IRR 1000ML BTL

## (undated) DEVICE — GLOVE SENSICARE PI GRN 6.5

## (undated) DEVICE — GLOVE SIGNATURE ESSNTL LTX 6

## (undated) DEVICE — Device

## (undated) DEVICE — JELLY SURGILUBE LUBE TUBE 2OZ

## (undated) DEVICE — PENCIL SMOKE EVAC TELSCP 15FT

## (undated) DEVICE — SYR 3ML LL 18GA 1.5IN

## (undated) DEVICE — SPONGE LAP 18X18 PREWASHED

## (undated) DEVICE — NDL ECLIPSE SAFETY 23G 1.5IN

## (undated) DEVICE — PAD PREP CUFFED NS 24X48IN

## (undated) DEVICE — SUT MCRYL PLUS 4-0 PS2 27IN

## (undated) DEVICE — SUT VICRYL CTD 2-0 GI 27 SH

## (undated) DEVICE — BLANKET WARMING UPPER BODY

## (undated) DEVICE — DRESSING TRANS 4X4 TEGADERM

## (undated) DEVICE — DRAPE MEDI-SLUSH 44X44IN

## (undated) DEVICE — ELECTRODE PATIENT RETURN DISP

## (undated) DEVICE — CLOSURE SKIN STERI STRIP 1/2X4

## (undated) DEVICE — DRESSING TELFA N ADH 3X8

## (undated) DEVICE — TRAY CATH FOL SIL DRN BAG 16FR

## (undated) DEVICE — SUT 2/0 27IN PLAIN GUT CT

## (undated) DEVICE — SUT CHROMIC 2-0 SH 27IN BRN

## (undated) DEVICE — BLADE SURG STAINLESS STEEL #10

## (undated) DEVICE — SUT 0 VICRYL PLUS CT-1 27IN

## (undated) DEVICE — SPONGE COTTON TRAY 4X4IN

## (undated) DEVICE — HEMOSTAT SURGICEL PWD 3G

## (undated) DEVICE — PENCIL ELECSURG ROCKER 15FT

## (undated) DEVICE — SUPPORT ULNA NERVE PROTECTOR

## (undated) DEVICE — NDL SYR 10ML 18X1.5 LL BLUNT